# Patient Record
Sex: FEMALE | Race: WHITE | Employment: FULL TIME | URBAN - METROPOLITAN AREA
[De-identification: names, ages, dates, MRNs, and addresses within clinical notes are randomized per-mention and may not be internally consistent; named-entity substitution may affect disease eponyms.]

---

## 2024-06-25 PROBLEM — E66.813 CLASS 3 SEVERE OBESITY DUE TO EXCESS CALORIES WITH BODY MASS INDEX (BMI) OF 45.0 TO 49.9 IN ADULT: Status: ACTIVE | Noted: 2024-06-25

## 2024-06-25 PROCEDURE — 80053 COMPREHEN METABOLIC PANEL: CPT

## 2024-06-25 PROCEDURE — 83690 ASSAY OF LIPASE: CPT

## 2024-06-25 PROCEDURE — 85027 COMPLETE CBC AUTOMATED: CPT

## 2024-07-08 ENCOUNTER — TELEPHONE (OUTPATIENT)
Dept: SURGERY | Facility: CLINIC | Age: 19
End: 2024-07-08
Payer: COMMERCIAL

## 2024-07-08 DIAGNOSIS — R10.84 GENERALIZED ABDOMINAL PAIN: Primary | ICD-10-CM

## 2024-07-08 NOTE — TELEPHONE ENCOUNTER
We have received a denial for the patients upcoming CT abdomen and pelvis with IV contrast. The insurance is requesting that the patient have an ultrasound or x-ray done first. Please advise.

## 2024-07-10 ENCOUNTER — HOSPITAL ENCOUNTER (OUTPATIENT)
Dept: RADIOLOGY | Facility: HOSPITAL | Age: 19
Discharge: HOME | End: 2024-07-10
Payer: COMMERCIAL

## 2024-07-10 ENCOUNTER — APPOINTMENT (OUTPATIENT)
Dept: RADIOLOGY | Facility: HOSPITAL | Age: 19
End: 2024-07-10
Payer: COMMERCIAL

## 2024-07-10 DIAGNOSIS — R10.84 GENERALIZED ABDOMINAL PAIN: ICD-10-CM

## 2024-07-10 PROCEDURE — 76700 US EXAM ABDOM COMPLETE: CPT

## 2024-07-10 PROCEDURE — 76700 US EXAM ABDOM COMPLETE: CPT | Performed by: RADIOLOGY

## 2024-07-15 ENCOUNTER — TELEPHONE (OUTPATIENT)
Dept: SURGERY | Facility: CLINIC | Age: 19
End: 2024-07-15
Payer: COMMERCIAL

## 2024-07-15 DIAGNOSIS — R19.8 ALTERNATING CONSTIPATION AND DIARRHEA: ICD-10-CM

## 2024-07-15 DIAGNOSIS — R10.84 GENERALIZED ABDOMINAL PAIN: Primary | ICD-10-CM

## 2024-07-15 NOTE — TELEPHONE ENCOUNTER
----- Message from Lo Leon sent at 7/15/2024 12:55 PM EDT -----  Please, let the patient know that the ultrasound does not show any abnormality.  Therefore, we will continue with the original plan to schedule her for a CT of the abdomen/pelvis.  Please schedule.

## 2024-07-23 ENCOUNTER — HOSPITAL ENCOUNTER (OUTPATIENT)
Dept: RADIOLOGY | Facility: HOSPITAL | Age: 19
Discharge: HOME | End: 2024-07-23
Payer: COMMERCIAL

## 2024-07-23 ENCOUNTER — APPOINTMENT (OUTPATIENT)
Dept: RADIOLOGY | Facility: HOSPITAL | Age: 19
End: 2024-07-23
Payer: COMMERCIAL

## 2024-07-23 DIAGNOSIS — R10.84 GENERALIZED ABDOMINAL PAIN: ICD-10-CM

## 2024-07-23 DIAGNOSIS — R19.8 ALTERNATING CONSTIPATION AND DIARRHEA: ICD-10-CM

## 2024-07-23 PROCEDURE — 74177 CT ABD & PELVIS W/CONTRAST: CPT

## 2024-07-23 PROCEDURE — 74177 CT ABD & PELVIS W/CONTRAST: CPT | Performed by: RADIOLOGY

## 2024-07-23 PROCEDURE — 2550000001 HC RX 255 CONTRASTS: Performed by: SURGERY

## 2024-07-24 ENCOUNTER — TELEPHONE (OUTPATIENT)
Dept: SURGERY | Facility: CLINIC | Age: 19
End: 2024-07-24
Payer: COMMERCIAL

## 2024-07-24 NOTE — TELEPHONE ENCOUNTER
----- Message from Lo Leon sent at 7/24/2024 11:31 AM EDT -----  Please, let her know that the CT of her abdomen/pelvis does NOT show any abnormalities to explain her pain.  Therefore, recommend that she continues taking the stomach medicine (Protonix) for the 30-day course as prescribed, and follow-up with GI for possible endoscopy evaluation.

## 2024-07-24 NOTE — RESULT ENCOUNTER NOTE
Please, let her know that the CT of her abdomen/pelvis does NOT show any abnormalities to explain her pain.  Therefore, recommend that she continues taking the stomach medicine (Protonix) for the 30-day course as prescribed, and follow-up with GI for possible endoscopy evaluation.

## 2024-09-13 ENCOUNTER — OFFICE VISIT (OUTPATIENT)
Dept: URGENT CARE | Age: 19
End: 2024-09-13
Payer: COMMERCIAL

## 2024-09-13 VITALS
TEMPERATURE: 97.4 F | DIASTOLIC BLOOD PRESSURE: 68 MMHG | OXYGEN SATURATION: 99 % | SYSTOLIC BLOOD PRESSURE: 99 MMHG | HEART RATE: 60 BPM | RESPIRATION RATE: 18 BRPM

## 2024-09-13 DIAGNOSIS — S39.012A LUMBAR STRAIN, INITIAL ENCOUNTER: Primary | ICD-10-CM

## 2024-09-13 RX ORDER — ARIPIPRAZOLE 15 MG/1
7.5 TABLET ORAL DAILY
COMMUNITY
Start: 2024-09-05

## 2024-09-13 RX ORDER — LIDOCAINE 50 MG/G
1 PATCH TOPICAL DAILY
Qty: 10 PATCH | Refills: 0 | Status: SHIPPED | OUTPATIENT
Start: 2024-09-13 | End: 2025-09-13

## 2024-09-13 RX ORDER — SERTRALINE HYDROCHLORIDE 25 MG/1
1 TABLET, FILM COATED ORAL
COMMUNITY
Start: 2024-09-05

## 2024-09-13 RX ORDER — METHYLPREDNISOLONE 4 MG/1
TABLET ORAL
Qty: 21 TABLET | Refills: 0 | Status: SHIPPED | OUTPATIENT
Start: 2024-09-13 | End: 2024-09-20

## 2024-09-13 ASSESSMENT — ENCOUNTER SYMPTOMS
NAUSEA: 0
VOMITING: 0
FEVER: 0
BOWEL INCONTINENCE: 0
DYSURIA: 0
ABDOMINAL PAIN: 0
WEAKNESS: 0
PARESTHESIAS: 0
TINGLING: 0
NUMBNESS: 0
BACK PAIN: 1

## 2024-09-13 ASSESSMENT — PAIN SCALES - GENERAL: PAINLEVEL: 7

## 2024-09-13 NOTE — LETTER
September 13, 2024     Patient: Nikole Almanza   YOB: 2005   Date of Visit: 9/13/2024       To Whom It May Concern:    It is my medical opinion that Nikole Almanza may return to work on 9/14/2024 .    If you have any questions or concerns, please don't hesitate to call.         Sincerely,        Marge Soto PA-C    CC: No Recipients

## 2024-09-13 NOTE — PROGRESS NOTES
"Subjective   Patient ID: Nikole Almanza \"Lizzie" is a 19 y.o. female. They present today with a chief complaint of Back Pain (Low Back Pain/Possibly Strain from lifting).    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 She denies specific injury. She has had similar pain in the past. She states she was seen by her PCP also for same before. Was given referral to a back specialist, though hasn't heard about appointment. She was rX Mobic which isn't helping much. She does not want to do under workman's comp. DENIES: falls, numbness, tingling, weakness, UTI symptoms. NO imaging.       Back Pain  This is a recurrent problem. The current episode started in the past 7 days. The problem is unchanged. The quality of the pain is described as aching. The pain radiates to the right foot. The pain is at a severity of 7/10. The symptoms are aggravated by bending. Pertinent negatives include no abdominal pain, bladder incontinence, bowel incontinence, dysuria, fever, numbness, paresthesias, tingling or weakness. She has tried NSAIDs for the symptoms.       Past Medical History  Allergies as of 09/13/2024    (No Known Allergies)       (Not in a hospital admission)       Past Medical History:   Diagnosis Date    Anxiety     Bipolar 1 disorder (Multi)     Depressed     GERD (gastroesophageal reflux disease)     IBS (irritable bowel syndrome)     OCD (obsessive compulsive disorder)        Past Surgical History:   Procedure Laterality Date    OTHER SURGICAL HISTORY  2007    polp removed    TONSILLECTOMY  06/2019    WISDOM TOOTH EXTRACTION  10/2020        reports that she has never smoked. She has never used smokeless tobacco. She reports current alcohol use. She reports current drug use. Drug: Marijuana.    Review of Systems  Review of Systems   Constitutional:  Negative for fever.   Gastrointestinal:  Negative for abdominal pain, bowel incontinence, " nausea and vomiting.   Genitourinary:  Negative for bladder incontinence and dysuria.   Musculoskeletal:  Positive for back pain.   Neurological:  Negative for tingling, weakness, numbness and paresthesias.                                  Objective    Vitals:    09/13/24 0954   BP: 99/68   Pulse: 60   Resp: 18   Temp: 36.3 °C (97.4 °F)   SpO2: 99%     No LMP recorded.    Physical Exam  Vitals reviewed.   Constitutional:       Appearance: Normal appearance.   HENT:      Head: Normocephalic and atraumatic.   Cardiovascular:      Rate and Rhythm: Normal rate and regular rhythm.      Heart sounds: Normal heart sounds.   Pulmonary:      Effort: Pulmonary effort is normal.      Breath sounds: Normal breath sounds.   Abdominal:      General: Abdomen is flat. Bowel sounds are normal.      Palpations: Abdomen is soft.      Tenderness: There is no abdominal tenderness. There is no right CVA tenderness, left CVA tenderness, guarding or rebound.   Musculoskeletal:      Lumbar back: Spasms and tenderness present. Normal range of motion. Positive right straight leg raise test. Negative left straight leg raise test.      Comments: TTP to bilateral paralumbar musculature, FULL ROM, pain with flexion, extension and right lateral bending. Normal gait.   Neurological:      General: No focal deficit present.      Sensory: Sensation is intact.      Gait: Gait is intact.      Deep Tendon Reflexes: Reflexes are normal and symmetric.      Reflex Scores:       Patellar reflexes are 2+ on the right side and 2+ on the left side.       Achilles reflexes are 2+ on the right side and 2+ on the left side.        Procedures    Point of Care Test & Imaging Results from this visit  No results found for this visit on 09/13/24.   No results found.    Diagnostic study results (if any) were reviewed by Marge Soto PA-C.    Assessment/Plan   Allergies, medications, history, and pertinent labs/EKGs/Imaging reviewed by Marge Soto PA-C.      Medical Decision Making  MDM- History and examination consistent with lumbar strain, no evidence of radiculopathy, acute cord compression, infection, or other vascular process at time. Plan is for supportive measures deferring on emergent imaging as it is not indicated at this time. Rx for lidocaine patches and medrol taper (advised to hold Mobic). Referral to PT. Patient encouraged to return to clinic or present to ED if symptoms change or worsen. Otherwise follow-up with PCP. Patient verbalized understanding and agrees with plan.     Orders and Diagnoses  There are no diagnoses linked to this encounter.    Medical Admin Record      Follow Up Instructions  No follow-ups on file.    Patient disposition: Home    Electronically signed by Marge Soto PA-C  9:58 AM

## 2024-09-16 ENCOUNTER — APPOINTMENT (OUTPATIENT)
Dept: GASTROENTEROLOGY | Facility: CLINIC | Age: 19
End: 2024-09-16
Payer: COMMERCIAL

## 2024-09-26 ENCOUNTER — OFFICE VISIT (OUTPATIENT)
Dept: URGENT CARE | Age: 19
End: 2024-09-26
Payer: COMMERCIAL

## 2024-09-26 ENCOUNTER — HOSPITAL ENCOUNTER (EMERGENCY)
Facility: HOSPITAL | Age: 19
Discharge: HOME | End: 2024-09-26
Attending: EMERGENCY MEDICINE
Payer: COMMERCIAL

## 2024-09-26 ENCOUNTER — DOCUMENTATION (OUTPATIENT)
Dept: URGENT CARE | Age: 19
End: 2024-09-26

## 2024-09-26 ENCOUNTER — APPOINTMENT (OUTPATIENT)
Dept: RADIOLOGY | Facility: HOSPITAL | Age: 19
End: 2024-09-26
Payer: COMMERCIAL

## 2024-09-26 VITALS
OXYGEN SATURATION: 98 % | SYSTOLIC BLOOD PRESSURE: 105 MMHG | TEMPERATURE: 98 F | RESPIRATION RATE: 18 BRPM | DIASTOLIC BLOOD PRESSURE: 61 MMHG | HEART RATE: 73 BPM

## 2024-09-26 VITALS
SYSTOLIC BLOOD PRESSURE: 126 MMHG | BODY MASS INDEX: 40.18 KG/M2 | HEIGHT: 66 IN | HEART RATE: 80 BPM | OXYGEN SATURATION: 99 % | DIASTOLIC BLOOD PRESSURE: 83 MMHG | WEIGHT: 250 LBS | RESPIRATION RATE: 18 BRPM | TEMPERATURE: 97.3 F

## 2024-09-26 DIAGNOSIS — R11.0 NAUSEA: ICD-10-CM

## 2024-09-26 DIAGNOSIS — K52.9 COLITIS: Primary | ICD-10-CM

## 2024-09-26 DIAGNOSIS — R10.31 RIGHT LOWER QUADRANT PAIN: Primary | ICD-10-CM

## 2024-09-26 LAB
ANION GAP SERPL CALC-SCNC: 11 MMOL/L (ref 10–20)
BASOPHILS # BLD AUTO: 0.04 X10*3/UL (ref 0–0.1)
BASOPHILS NFR BLD AUTO: 0.4 %
BUN SERPL-MCNC: 7 MG/DL (ref 6–23)
CALCIUM SERPL-MCNC: 8.9 MG/DL (ref 8.6–10.3)
CHLORIDE SERPL-SCNC: 105 MMOL/L (ref 98–107)
CO2 SERPL-SCNC: 25 MMOL/L (ref 21–32)
CREAT SERPL-MCNC: 0.81 MG/DL (ref 0.5–1.05)
EGFRCR SERPLBLD CKD-EPI 2021: >90 ML/MIN/1.73M*2
EOSINOPHIL # BLD AUTO: 0.11 X10*3/UL (ref 0–0.7)
EOSINOPHIL NFR BLD AUTO: 1 %
ERYTHROCYTE [DISTWIDTH] IN BLOOD BY AUTOMATED COUNT: 14.1 % (ref 11.5–14.5)
GLUCOSE SERPL-MCNC: 107 MG/DL (ref 74–99)
HCT VFR BLD AUTO: 44.1 % (ref 36–46)
HGB BLD-MCNC: 14.1 G/DL (ref 12–16)
IMM GRANULOCYTES # BLD AUTO: 0.04 X10*3/UL (ref 0–0.7)
IMM GRANULOCYTES NFR BLD AUTO: 0.4 % (ref 0–0.9)
LACTATE SERPL-SCNC: 1.3 MMOL/L (ref 0.4–2)
LYMPHOCYTES # BLD AUTO: 2.12 X10*3/UL (ref 1.2–4.8)
LYMPHOCYTES NFR BLD AUTO: 18.8 %
MCH RBC QN AUTO: 28.7 PG (ref 26–34)
MCHC RBC AUTO-ENTMCNC: 32 G/DL (ref 32–36)
MCV RBC AUTO: 90 FL (ref 80–100)
MONOCYTES # BLD AUTO: 0.51 X10*3/UL (ref 0.1–1)
MONOCYTES NFR BLD AUTO: 4.5 %
NEUTROPHILS # BLD AUTO: 8.43 X10*3/UL (ref 1.2–7.7)
NEUTROPHILS NFR BLD AUTO: 74.9 %
NRBC BLD-RTO: 0 /100 WBCS (ref 0–0)
PLATELET # BLD AUTO: 315 X10*3/UL (ref 150–450)
POC APPEARANCE, URINE: CLEAR
POC BILIRUBIN, URINE: NEGATIVE
POC BLOOD, URINE: NEGATIVE
POC COLOR, URINE: ABNORMAL
POC GLUCOSE, URINE: NEGATIVE MG/DL
POC KETONES, URINE: NEGATIVE MG/DL
POC LEUKOCYTES, URINE: NEGATIVE
POC NITRITE,URINE: NEGATIVE
POC PH, URINE: 6 PH
POC PROTEIN, URINE: NEGATIVE MG/DL
POC RAPID INFLUENZA A: NEGATIVE
POC RAPID INFLUENZA B: NEGATIVE
POC SARS-COV-2 AG BINAX: NORMAL
POC SPECIFIC GRAVITY, URINE: 1.01
POC UROBILINOGEN, URINE: 2 EU/DL
POTASSIUM SERPL-SCNC: 4.1 MMOL/L (ref 3.5–5.3)
PREGNANCY TEST URINE, POC: NEGATIVE
RBC # BLD AUTO: 4.92 X10*6/UL (ref 4–5.2)
SODIUM SERPL-SCNC: 137 MMOL/L (ref 136–145)
WBC # BLD AUTO: 11.3 X10*3/UL (ref 4.4–11.3)

## 2024-09-26 PROCEDURE — 96375 TX/PRO/DX INJ NEW DRUG ADDON: CPT

## 2024-09-26 PROCEDURE — 96361 HYDRATE IV INFUSION ADD-ON: CPT

## 2024-09-26 PROCEDURE — 2500000004 HC RX 250 GENERAL PHARMACY W/ HCPCS (ALT 636 FOR OP/ED): Performed by: NURSE PRACTITIONER

## 2024-09-26 PROCEDURE — 74177 CT ABD & PELVIS W/CONTRAST: CPT

## 2024-09-26 PROCEDURE — 82374 ASSAY BLOOD CARBON DIOXIDE: CPT | Performed by: NURSE PRACTITIONER

## 2024-09-26 PROCEDURE — 96374 THER/PROPH/DIAG INJ IV PUSH: CPT

## 2024-09-26 PROCEDURE — 2550000001 HC RX 255 CONTRASTS: Performed by: NURSE PRACTITIONER

## 2024-09-26 PROCEDURE — 85025 COMPLETE CBC W/AUTO DIFF WBC: CPT | Performed by: NURSE PRACTITIONER

## 2024-09-26 PROCEDURE — 83605 ASSAY OF LACTIC ACID: CPT | Performed by: NURSE PRACTITIONER

## 2024-09-26 PROCEDURE — 99284 EMERGENCY DEPT VISIT MOD MDM: CPT

## 2024-09-26 PROCEDURE — 74177 CT ABD & PELVIS W/CONTRAST: CPT | Performed by: RADIOLOGY

## 2024-09-26 PROCEDURE — 36415 COLL VENOUS BLD VENIPUNCTURE: CPT | Performed by: NURSE PRACTITIONER

## 2024-09-26 RX ORDER — KETOROLAC TROMETHAMINE 30 MG/ML
30 INJECTION, SOLUTION INTRAMUSCULAR; INTRAVENOUS ONCE
Status: COMPLETED | OUTPATIENT
Start: 2024-09-26 | End: 2024-09-26

## 2024-09-26 RX ORDER — ONDANSETRON HYDROCHLORIDE 2 MG/ML
4 INJECTION, SOLUTION INTRAVENOUS ONCE
Status: COMPLETED | OUTPATIENT
Start: 2024-09-26 | End: 2024-09-26

## 2024-09-26 RX ADMIN — KETOROLAC TROMETHAMINE 30 MG: 30 INJECTION, SOLUTION INTRAMUSCULAR at 14:13

## 2024-09-26 RX ADMIN — SODIUM CHLORIDE 1000 ML: 9 INJECTION, SOLUTION INTRAVENOUS at 14:19

## 2024-09-26 RX ADMIN — ONDANSETRON 4 MG: 2 INJECTION INTRAMUSCULAR; INTRAVENOUS at 14:13

## 2024-09-26 RX ADMIN — IOHEXOL 100 ML: 350 INJECTION, SOLUTION INTRAVENOUS at 17:20

## 2024-09-26 ASSESSMENT — PAIN DESCRIPTION - ORIENTATION: ORIENTATION: RIGHT;LOWER

## 2024-09-26 ASSESSMENT — ENCOUNTER SYMPTOMS
DIARRHEA: 1
NEUROLOGICAL NEGATIVE: 1
MUSCULOSKELETAL NEGATIVE: 1
VOMITING: 1
PSYCHIATRIC NEGATIVE: 1
COUGH: 1

## 2024-09-26 ASSESSMENT — PAIN SCALES - GENERAL
PAINLEVEL_OUTOF10: 6
PAINLEVEL_OUTOF10: 7
PAINLEVEL_OUTOF10: 7

## 2024-09-26 ASSESSMENT — PAIN DESCRIPTION - PAIN TYPE: TYPE: ACUTE PAIN

## 2024-09-26 ASSESSMENT — COLUMBIA-SUICIDE SEVERITY RATING SCALE - C-SSRS
2. HAVE YOU ACTUALLY HAD ANY THOUGHTS OF KILLING YOURSELF?: NO
1. IN THE PAST MONTH, HAVE YOU WISHED YOU WERE DEAD OR WISHED YOU COULD GO TO SLEEP AND NOT WAKE UP?: NO
6. HAVE YOU EVER DONE ANYTHING, STARTED TO DO ANYTHING, OR PREPARED TO DO ANYTHING TO END YOUR LIFE?: NO

## 2024-09-26 ASSESSMENT — PAIN DESCRIPTION - LOCATION: LOCATION: ABDOMEN

## 2024-09-26 ASSESSMENT — PAIN - FUNCTIONAL ASSESSMENT: PAIN_FUNCTIONAL_ASSESSMENT: 0-10

## 2024-09-26 NOTE — Clinical Note
Nikole Almanza was seen and treated in our emergency department on 9/26/2024.  She may return to work on 09/27/2024.       If you have any questions or concerns, please don't hesitate to call.      Jeanette Bennett MD

## 2024-09-26 NOTE — ED PROVIDER NOTES
Chief Complaint   Patient presents with    Abdominal Pain     LRQ abd pain began last Friday NVD and febrile at home        HPI       19 year old female presents to the Emergency Department today complaining of a 3 day history of RLQ pain that she describes as sharp in nature, constant, non-radiating, and varies in intensity. Reports to having nausea with multiple episodes of vomiting and the inability to keep by mouth fluids down associated with the above. Developed a fever yesterday. Denies any associated fever, chills, headache, neck pain, chest pain, shortness of breath, constipation, hematemesis, hematochezia, melena, or urinary symptoms.       History provided by:  Patient             Patient History   Past Medical History:   Diagnosis Date    Anxiety     Bipolar 1 disorder (Multi)     Depressed     GERD (gastroesophageal reflux disease)     IBS (irritable bowel syndrome)     OCD (obsessive compulsive disorder)      Past Surgical History:   Procedure Laterality Date    OTHER SURGICAL HISTORY  2007    polp removed    TONSILLECTOMY  06/2019    WISDOM TOOTH EXTRACTION  10/2020     Family History   Problem Relation Name Age of Onset    Hypertension Mother      Hypertension Father      Diabetes Father      Heart failure Father      Hypertension Maternal Grandfather      Stomach cancer Maternal Grandfather      Hypertension Paternal Grandmother      Diabetes Paternal Grandmother      Heart failure Paternal Grandmother      COPD Paternal Grandmother      Ovarian cancer Paternal Grandmother      Hypertension Paternal Grandfather      Diabetes Paternal Grandfather       Social History     Tobacco Use    Smoking status: Never    Smokeless tobacco: Never   Vaping Use    Vaping status: Some Days   Substance Use Topics    Alcohol use: Yes     Comment: occassional    Drug use: Yes     Types: Marijuana     Comment: very occassional           Physical Exam  Constitutional:       Appearance: Normal appearance.   HENT:       Head: Normocephalic.      Right Ear: Tympanic membrane, ear canal and external ear normal.      Left Ear: Tympanic membrane, ear canal and external ear normal.      Nose: Nose normal.      Mouth/Throat:      Mouth: Mucous membranes are moist.      Pharynx: Oropharynx is clear. No oropharyngeal exudate or posterior oropharyngeal erythema.   Eyes:      Conjunctiva/sclera: Conjunctivae normal.      Pupils: Pupils are equal, round, and reactive to light.   Cardiovascular:      Rate and Rhythm: Normal rate and regular rhythm.      Pulses:           Radial pulses are 3+ on the right side and 3+ on the left side.        Dorsalis pedis pulses are 3+ on the right side and 3+ on the left side.      Heart sounds: Normal heart sounds. No murmur heard.     No friction rub. No gallop.   Pulmonary:      Effort: Pulmonary effort is normal. No respiratory distress.      Breath sounds: Normal breath sounds. No wheezing, rhonchi or rales.   Abdominal:      General: Abdomen is flat. Bowel sounds are normal.      Palpations: Abdomen is soft.      Tenderness: There is no abdominal tenderness. There is no right CVA tenderness, left CVA tenderness, guarding or rebound. Negative signs include Torrez's sign and McBurney's sign.   Musculoskeletal:         General: No swelling or deformity.      Cervical back: Full passive range of motion without pain.      Right lower leg: No edema.      Left lower leg: No edema.   Lymphadenopathy:      Cervical: No cervical adenopathy.   Skin:     Capillary Refill: Capillary refill takes less than 2 seconds.      Coloration: Skin is not jaundiced.      Findings: No rash.   Neurological:      General: No focal deficit present.      Mental Status: She is alert and oriented to person, place, and time. Mental status is at baseline.      Gait: Gait is intact.   Psychiatric:         Mood and Affect: Mood normal.         Behavior: Behavior is cooperative.         Labs Reviewed - No data to display    No orders to  display            ED Course & MDM   Diagnoses as of 09/26/24 1855   Colitis           Medical Decision Making  Patient was seen and evaluated by myself in triage per [facility] protocol. History and physical exam was obtained and orders were placed based on such.      Sterling Restrepo MSN CNP      Took over care of this patient when she was brought back at 1855.    Patient presents to the emergency department secondary to abdominal pain and diarrhea.  Patient has had fever at home.  Last fever was yesterday.    Physical exam on the patient currently.  Patient has some mild right sided tenderness to palpation.  No rebound or guarding.  Bowel sounds are present.  Heart rate is regular.  Lungs clear to auscultation.  Extremities are nontender.    Laboratory workup shows a glucose of 107.  Lactate is normal.  White counts within normal limits.  CT scan shows evidence of possible colitis.  I went and discussed symptoms with the patient.  She states that last time she had a fever was yesterday and that she actually has a longstanding history of diarrhea.  At this time I would not treat the patient with antibiotics with negative lab work.  This likely is a self-limited illness.  Patient was given referral to GI as an outpatient for possible scope.  She should return for new or worsening symptoms.      I saw the patient in conjunction with the nurse practitioner.  I reviewed his history of present illness and physical exam.  I agree with his documentation.           Your medication list        ASK your doctor about these medications        Instructions Last Dose Given Next Dose Due   ARIPiprazole 15 mg tablet  Commonly known as: Tanvi           Junel FE 1.5/30 (28) 1.5 mg-30 mcg (21)/75 mg (7) tablet  Generic drug: norethindrone-e.estradioL-iron           lidocaine 5 % patch  Commonly known as: Lidoderm      Place 1 patch over 12 hours on the skin once daily. Apply to painful area 12 hours per day, remove for 12  hours.       methylPREDNISolone 4 mg tablets  Commonly known as: Medrol Dospak  Ask about: Should I take this medication?      Take as directed on package.       pantoprazole 40 mg EC tablet  Commonly known as: ProtoNix      Take 1 tablet (40 mg) by mouth once daily in the morning. Take before meals. Do not crush, chew, or split.       sertraline 25 mg tablet  Commonly known as: Zoloft                      Procedure  Procedures     Jeanette Bennett MD  09/26/24 9228

## 2024-09-27 NOTE — PROGRESS NOTES
"Subjective   Patient ID: Nikole Almanza \"Lizzie" is a 19 y.o. female. They present today with a chief complaint of Vomiting, Diarrhea, Nausea, URI, Cough, and Nasal Congestion.    History of Present Illness  18 yo female presents with c/o nausea, diarrhea, abdominal pain, chills x 2-3 days.  Food makes it worse, nothing makes it better      Vomiting  Associated symptoms: cough, diarrhea and URI    Diarrhea  Associated symptoms: URI and vomiting    URI  Presenting symptoms: cough    Cough        Past Medical History  Allergies as of 09/26/2024    (No Known Allergies)       (Not in a hospital admission)       Past Medical History:   Diagnosis Date    Anxiety     Bipolar 1 disorder (Multi)     Depressed     GERD (gastroesophageal reflux disease)     IBS (irritable bowel syndrome)     OCD (obsessive compulsive disorder)        Past Surgical History:   Procedure Laterality Date    OTHER SURGICAL HISTORY  2007    polp removed    TONSILLECTOMY  06/2019    WISDOM TOOTH EXTRACTION  10/2020        reports that she has never smoked. She has never used smokeless tobacco. She reports current alcohol use. She reports current drug use. Drug: Marijuana.    Review of Systems  Review of Systems   Respiratory:  Positive for cough.    Gastrointestinal:  Positive for diarrhea and vomiting.   Genitourinary: Negative.    Musculoskeletal: Negative.    Neurological: Negative.    Psychiatric/Behavioral: Negative.                                    Objective    Vitals:    09/26/24 1042   BP: 105/61   Pulse: 73   Resp: 18   Temp: 36.7 °C (98 °F)   SpO2: 98%     Patient's last menstrual period was 09/16/2024.    Physical Exam  Constitutional:       Appearance: She is ill-appearing.   Cardiovascular:      Rate and Rhythm: Normal rate and regular rhythm.   Pulmonary:      Effort: Pulmonary effort is normal.      Breath sounds: Normal breath sounds.   Abdominal:      General: There is no distension.      Palpations: Abdomen is soft. There is " no mass.      Tenderness: There is abdominal tenderness (Tender RLQ, midepigastric). There is left CVA tenderness. There is no right CVA tenderness, guarding or rebound.      Hernia: No hernia is present.      Comments: Hypoactive bowel sounds   Musculoskeletal:         General: Normal range of motion.   Skin:     General: Skin is warm.      Capillary Refill: Capillary refill takes less than 2 seconds.   Neurological:      General: No focal deficit present.      Mental Status: She is alert and oriented to person, place, and time.   Psychiatric:         Mood and Affect: Mood normal.         Behavior: Behavior normal.         Thought Content: Thought content normal.         Judgment: Judgment normal.         Procedures    Point of Care Test & Imaging Results from this visit  Results for orders placed or performed in visit on 09/26/24   POCT Covid-19 Rapid Antigen   Result Value Ref Range    POC ANGELIQUE-COV-2 AG  Presumptive negative test for SARS-CoV-2 (no antigen detected)     Presumptive negative test for SARS-CoV-2 (no antigen detected)   POCT Influenza A/B manually resulted   Result Value Ref Range    POC Rapid Influenza A Negative Negative    POC Rapid Influenza B Negative Negative   POCT UA Automated manually resulted   Result Value Ref Range    POC Color, Urine Light-Yellow Straw, Yellow, Light-Yellow    POC Appearance, Urine Clear Clear    POC Glucose, Urine NEGATIVE NEGATIVE mg/dl    POC Bilirubin, Urine NEGATIVE NEGATIVE    POC Ketones, Urine NEGATIVE NEGATIVE mg/dl    POC Specific Gravity, Urine 1.015 1.005 - 1.035    POC Blood, Urine NEGATIVE NEGATIVE    POC PH, Urine 6.0 No Reference Range Established PH    POC Protein, Urine NEGATIVE NEGATIVE, 30 (1+) mg/dl    POC Urobilinogen, Urine 2.0 (A) 0.2, 1.0 EU/DL    Poc Nitrite, Urine NEGATIVE NEGATIVE    POC Leukocytes, Urine NEGATIVE NEGATIVE   POCT pregnancy, urine manually resulted   Result Value Ref Range    Preg Test, Ur Negative Negative      CT abdomen  pelvis w IV contrast    Result Date: 9/26/2024  Interpreted By:  Mae Medrano, STUDY: CT ABDOMEN PELVIS W IV CONTRAST;  9/26/2024 5:28 pm   INDICATION: 18 y/o   F with  Signs/Symptoms:RLQ pain.   LIMITATIONS: None.   ACCESSION NUMBER(S): [id number]   ORDERING CLINICIAN: IVAN KU   TECHNIQUE: After the administration of IV iodonated contrast, spiral axial images were obtained from the xiphoid down through the symphysis pubis. Sagittal and coronal reconstruction images were generated. 100 mL of Omnipaque 350.   COMPARISON: 07/23/2024   FINDINGS: Lower Chest: Clear.   Liver: The liver is unremarkable without focal lesion.   Gallbladder and Biliary: Unremarkable.   Pancreas: No abnormality identified in the pancreas.   Spleen: No abnormality identified in the spleen.   Adrenals: No abnormality identified in either adrenal gland.   Urinary: No parenchymal abnormality identified in either kidney. No hydronephrosis.   Gastrointestinal/Peritoneum: No small or large bowel obstruction in the visualized abdomen. In the abdomen, there is no extraluminal air. No significant free fluid. No evidence of acute appendicitis. Mild bowel wall thickening involving the cecum, ascending colon, transverse colon, and descending colon suggestive of colitis in the appropriate clinical setting.   Vascular: Abdominal aorta is normal in caliber.   Lymphatics: No enlarged lymph nodes by size criteria.   MSK/Body Wall: No aggressive bony lesion identified.       Mild bowel wall thickening extending from the cecum to the descending colon suggestive of colitis in the appropriate clinical setting.   Signed by: Mae Medrano 9/26/2024 5:56 PM Dictation workstation:   [id number]     Diagnostic study results (if any) were reviewed by [facility].    Assessment/Plan   Allergies, medications, history, and pertinent labs/EKGs/Imaging reviewed by Gladys Blandon, APRN-CNP.     Medical Decision Making  Patient with signs and  symptoms consistent with abdominal pain  that cannot be properly assessed in office and is stable. Patient referred to ED for further  evaluation and testing.   Dr Waldrop notified  Orders and Diagnoses  Diagnoses and all orders for this visit:  Right lower quadrant pain  Nausea  -     POCT Covid-19 Rapid Antigen  -     POCT Influenza A/B manually resulted  -     POCT UA Automated manually resulted  -     POCT pregnancy, urine manually resulted      Medical Admin Record      Patient disposition: ED    Electronically signed by [facility]  11:22 PM

## 2024-09-28 NOTE — PROGRESS NOTES
"Subjective   Patient ID: Nikole Almanza \"Lizzie" is a 19 y.o. female. They present today with a chief complaint of No chief complaint on file..    History of Present Illness  HPI    Past Medical History  Allergies as of 09/26/2024    (No Known Allergies)       (Not in a hospital admission)       Past Medical History:   Diagnosis Date    Anxiety     Bipolar 1 disorder (Multi)     Depressed     GERD (gastroesophageal reflux disease)     IBS (irritable bowel syndrome)     OCD (obsessive compulsive disorder)        Past Surgical History:   Procedure Laterality Date    OTHER SURGICAL HISTORY  2007    polp removed    TONSILLECTOMY  06/2019    WISDOM TOOTH EXTRACTION  10/2020        reports that she has never smoked. She has never used smokeless tobacco. She reports current alcohol use. She reports current drug use. Drug: Marijuana.    Review of Systems  Review of Systems                               Objective    There were no vitals filed for this visit.  Patient's last menstrual period was 09/16/2024.    Physical Exam    Procedures    Point of Care Test & Imaging Results from this visit  No results found for this visit on 09/26/24.   CT abdomen pelvis w IV contrast    Result Date: 9/26/2024  Interpreted By:  Mae Medrano, STUDY: CT ABDOMEN PELVIS W IV CONTRAST;  9/26/2024 5:28 pm   INDICATION: 18 y/o   F with  Signs/Symptoms:RLQ pain.   LIMITATIONS: None.   ACCESSION NUMBER(S): [id number]   ORDERING CLINICIAN: IVAN KU   TECHNIQUE: After the administration of IV iodonated contrast, spiral axial images were obtained from the xiphoid down through the symphysis pubis. Sagittal and coronal reconstruction images were generated. 100 mL of Omnipaque 350.   COMPARISON: 07/23/2024   FINDINGS: Lower Chest: Clear.   Liver: The liver is unremarkable without focal lesion.   Gallbladder and Biliary: Unremarkable.   Pancreas: No abnormality identified in the pancreas.   Spleen: No abnormality identified in the " spleen.   Adrenals: No abnormality identified in either adrenal gland.   Urinary: No parenchymal abnormality identified in either kidney. No hydronephrosis.   Gastrointestinal/Peritoneum: No small or large bowel obstruction in the visualized abdomen. In the abdomen, there is no extraluminal air. No significant free fluid. No evidence of acute appendicitis. Mild bowel wall thickening involving the cecum, ascending colon, transverse colon, and descending colon suggestive of colitis in the appropriate clinical setting.   Vascular: Abdominal aorta is normal in caliber.   Lymphatics: No enlarged lymph nodes by size criteria.   MSK/Body Wall: No aggressive bony lesion identified.       Mild bowel wall thickening extending from the cecum to the descending colon suggestive of colitis in the appropriate clinical setting.   Signed by: Mae Medrano 9/26/2024 5:56 PM Dictation workstation:   [id number]     Diagnostic study results (if any) were reviewed by LUIZ Fournier.    Assessment/Plan   Allergies, medications, history, and pertinent labs/EKGs/Imaging reviewed by LUIZ Fournier.     Medical Decision Making      Orders and Diagnoses  There are no diagnoses linked to this encounter.    Medical Admin Record      Patient disposition: ED    Electronically signed by LUIZ Fournier  11:50 AM

## 2024-10-07 ENCOUNTER — EVALUATION (OUTPATIENT)
Dept: PHYSICAL THERAPY | Facility: CLINIC | Age: 19
End: 2024-10-07
Payer: COMMERCIAL

## 2024-10-07 DIAGNOSIS — S39.012A LUMBAR STRAIN, INITIAL ENCOUNTER: ICD-10-CM

## 2024-10-07 PROCEDURE — 97140 MANUAL THERAPY 1/> REGIONS: CPT | Mod: GP

## 2024-10-07 PROCEDURE — 97161 PT EVAL LOW COMPLEX 20 MIN: CPT | Mod: GP

## 2024-10-07 PROCEDURE — 97110 THERAPEUTIC EXERCISES: CPT | Mod: GP

## 2024-10-07 ASSESSMENT — COLUMBIA-SUICIDE SEVERITY RATING SCALE - C-SSRS
1. IN THE PAST MONTH, HAVE YOU WISHED YOU WERE DEAD OR WISHED YOU COULD GO TO SLEEP AND NOT WAKE UP?: NO
6. HAVE YOU EVER DONE ANYTHING, STARTED TO DO ANYTHING, OR PREPARED TO DO ANYTHING TO END YOUR LIFE?: NO
2. HAVE YOU ACTUALLY HAD ANY THOUGHTS OF KILLING YOURSELF?: NO

## 2024-10-07 ASSESSMENT — ENCOUNTER SYMPTOMS
DEPRESSION: 0
OCCASIONAL FEELINGS OF UNSTEADINESS: 1
LOSS OF SENSATION IN FEET: 1

## 2024-10-07 ASSESSMENT — PATIENT HEALTH QUESTIONNAIRE - PHQ9
1. LITTLE INTEREST OR PLEASURE IN DOING THINGS: NOT AT ALL
2. FEELING DOWN, DEPRESSED OR HOPELESS: NOT AT ALL
SUM OF ALL RESPONSES TO PHQ9 QUESTIONS 1 AND 2: 0

## 2024-10-07 NOTE — PROGRESS NOTES
"        Physical Therapy Evaluation and Treatment      Patient Name: Nikole Almanza \"Suki\"  MRN: [id number]  Today's Date: 10/7/2024     Time Calculation  Start Time: 1445  Stop Time: 1530  Time Calculation (min): 45 min  PT Therapeutic Procedures Time Entry  Manual Therapy Time Entry: 22  Therapeutic Exercise Time Entry: 8    Visit:1/MN  Referred by: Marge Soto PA-C       Insurance: [facility]   Certification Dates:  10/7/2024 - 12/31/2024  Current Problem:   Problem List Items Addressed This Visit    None  Visit Diagnoses         Codes    Lumbar strain, initial encounter     S39.012A          1. Lumbar strain, initial encounter  Referral to Physical Therapy                 Subjective:  Pt stated she went to PT for the same back pain issue in high school. She played softball and participated in marching band. Stated she was lifting heavy loads at work and had an onset of pain when she was leaving. Her pain was severe and it was difficult to sit in the car. There are times when her low back pain will keep her up at night. She is unable to sleep on her back due to pain.    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  The quality of the pain is described as aching. The pain radiates to the right foot. The pain is at a severity of 7/10. The symptoms are aggravated by bending     Social Factors:  1-2 personal factors &/or " comorbidities (depression, migraines)     Precautions: as tolerated        Objective   ROM   Hip Strength: MMT Left Right   Flexion 5/5 5/5   Abduction 5/5 5/5   Quadriceps 5/5 5/5   Hamstrings 5/5 5/5   *denotes pain with motion or muscle testing      Lumbar AROM: (% movement)   Flexion 1-2 inches from floor *   Extension WNL    Right Sidebend 30*   Left Sidebend 30*   Right Rotation 40   Left Rotation 40   Flexibility:      Hamstrings 90 B   Hip Flexion Rt 100, Lt 103     Palpation: + tenderness Rt low back, L2 - L5, PSIS  Special Testing:   SLR: Rt +, Lt -         Slump: Rt +, Lt -                               Wt Shift Rt  - , Lt -    DTR's:  Patellar (L3/L4): 2+ Calcaneal (S1/S2): 2+    Gait: amb w/o AD    Outcome Measures: DORIS= 20%    Treatments:    Access Code: [email or web address]  URL: [email or web address]  Date: 10/07/2024  Prepared by: Yulia Hopper    Exercises  - Supine Posterior Pelvic Tilt  - 1 x daily - 7 x weekly - 1-2 sets - 10 reps - 5 hold  - Supine Lower Trunk Rotation  - 1 x daily - 7 x weekly - 1-2 sets - 10 reps - 5 hold  - Supine Bridge  - 1 x daily - 7 x weekly - 1-2 sets - 10 reps - 5 hold  - Prone Press Up  - 1 x daily - 7 x weekly - 1-2 sets - 10 reps - 5 hold    Manual  STM through Rt low back L2 - L5, pt prone, Rt long axis distraction     Assessment:     Pt is a 18 yo female who presented to the clinic today  for evaluation of their lumbar strain.  PMH: Bipolar, depression, anxiety, migraines.  Examination reveals the following deficits: increase pain at end range of motion through lumbar segments and decrease functional mobility to perform heavy lifts at work. These deficits lead to difficulties with ADLs as well as recreational activity.  Skilled PT will address these deficits to help reduce pain for return to PLOF.  She will benefit from skilled PT to address the above mentioned impairments.  Low complexity due to patient's clinical presentation being stable and  uncomplicated by any significant comorbidities that may affect rehab tolerance and progression.     Clinical presentation:  Stable and/or uncomplicated characteristics,     Problem List:  increase pain at end range of motion through lumbar segments and decrease functional mobility to perform heavy lifts at work.    Plan: Continue per POC & pt tolerance.  Frequency/duration: 1-2x/wk for 8 wks      Planned Interventions:  MT, therapeutic exercise, neuro re-ed, modalities prn  Patient/caregiver agrees with POC:  yes    Rehab potential: good  Plan of care agreement: Patient understands & agrees with goals & plan documented    EDUCATION: HEP, POC, activity modifications/progression, pain management/modalities, and injury pathology     TODAY'S TREATMENT  - Evaluation of the low back completed.  Pt was educated on their dx and the pertinent anatomy.    - HEP as seen above:    Goals:   Active       PT Problem       PT Goal 1       Start:  10/07/24    Expected End:  12/31/24       Pt independent with HEP and consistent for optimal recovery and self-management after DC from PT.           PT Goal 2       Start:  10/07/24    Expected End:  12/31/24       Pt will decrease DORIS score to < or = 8% to demonstrate functional improvement.           PT Goal 3       Start:  10/07/24    Expected End:  12/31/24       Pt will demonstrate proper biomechanics for lifting 75% of the time to tolerate lifting boxes at work.         PT Goal 4       Start:  10/07/24    Expected End:  12/31/24       Pt will decrease pain at worst to 4/10 to tolerate positional loads at end range and complete work with minimal pain.

## 2024-10-17 ENCOUNTER — CONSULT (OUTPATIENT)
Facility: HOSPITAL | Age: 19
End: 2024-10-17
Payer: COMMERCIAL

## 2024-10-17 VITALS — SYSTOLIC BLOOD PRESSURE: 140 MMHG | DIASTOLIC BLOOD PRESSURE: 82 MMHG | HEART RATE: 88 BPM | RESPIRATION RATE: 20 BRPM

## 2024-10-17 DIAGNOSIS — M54.41 MIDLINE LOW BACK PAIN WITH RIGHT-SIDED SCIATICA, UNSPECIFIED CHRONICITY: ICD-10-CM

## 2024-10-17 DIAGNOSIS — M54.16 RIGHT LUMBAR RADICULOPATHY: Primary | ICD-10-CM

## 2024-10-17 PROCEDURE — 99214 OFFICE O/P EST MOD 30 MIN: CPT | Performed by: PHYSICAL MEDICINE & REHABILITATION

## 2024-10-17 PROCEDURE — 99204 OFFICE O/P NEW MOD 45 MIN: CPT | Performed by: PHYSICAL MEDICINE & REHABILITATION

## 2024-10-17 RX ORDER — GABAPENTIN 300 MG/1
300 CAPSULE ORAL 2 TIMES DAILY
Qty: 60 CAPSULE | Refills: 3 | Status: SHIPPED | OUTPATIENT
Start: 2024-10-17 | End: 2024-11-16

## 2024-10-17 RX ORDER — OMEPRAZOLE 20 MG/1
20 CAPSULE, DELAYED RELEASE ORAL
COMMUNITY
Start: 2024-10-07

## 2024-10-17 ASSESSMENT — PAIN SCALES - GENERAL: PAINLEVEL_OUTOF10: 4

## 2024-10-17 NOTE — PROGRESS NOTES
Physical Medicine and Rehabilitation MSK Consult  10/17/24       Chief Complaint:  Low back pain    Referred by pcp     HPI:  Nikole Almanza is a  19 y.o. F ho presents to the clinic today  for evaluation of low back pain.  Onset: pain started in 2020 on and off, better therapy  Started flair ing up 6 weeks ago at work, not sure if she did anything  Location: R lower back  Radiation: buttock., back of thigh and calf into ankle, no issues w the ankle  Quality: sharp,tingling and numb  Sometimes weakness, few times gave out  Duration: comes and goes daily  Aggravating factors:  sitting, coughing  Alleviating factors: changing positions  Severity: 6  Numbness/Tingling: Yes -   Bowel or bladder incontinence: No  Pt's current medication regimen includes: mobic w mild relief  Lidocaine patches wo relief     Treatment to date:  Physical therapy: Yes - [id number] w some relief  And now she has PT again  Medications taken to date for this complaint include the following:   As above  Prior injections: Yes -         Imaging  None of spine     Past Medical History:   Diagnosis Date    Anxiety     Bipolar 1 disorder (Multi)     Depressed     GERD (gastroesophageal reflux disease)     IBS (irritable bowel syndrome)     OCD (obsessive compulsive disorder)         Past Surgical History:   Procedure Laterality Date    OTHER SURGICAL HISTORY  2007    polp removed    TONSILLECTOMY  06/2019    WISDOM TOOTH EXTRACTION  10/2020        Patient Active Problem List    Diagnosis Date Noted    Alternating constipation and diarrhea 06/25/2024    Right upper quadrant abdominal pain 06/25/2024    Generalized abdominal pain 06/25/2024    Class 3 severe obesity due to excess calories with body mass index (BMI) of 45.0 to 49.9 in adult 06/25/2024        Family History   Problem Relation Name Age of Onset    Hypertension Mother      Hypertension Father      Diabetes Father      Heart failure Father      Hypertension Maternal Grandfather       Stomach cancer Maternal Grandfather      Hypertension Paternal Grandmother      Diabetes Paternal Grandmother      Heart failure Paternal Grandmother      COPD Paternal Grandmother      Ovarian cancer Paternal Grandmother      Hypertension Paternal Grandfather      Diabetes Paternal Grandfather          Current Outpatient Medications   Medication Sig Dispense Refill    ARIPiprazole (Abilify) 15 mg tablet Take 0.5 tablets (7.5 mg) by mouth once daily.      Junel FE 1.5/30, 28, 1.5 mg-30 mcg (21)/75 mg (7) tablet       omeprazole (PriLOSEC) 20 mg DR capsule Take 1 capsule (20 mg) by mouth once daily in the morning. Take before meals.      sertraline (Zoloft) 25 mg tablet Take 1 tablet (25 mg) by mouth early in the morning..      lidocaine (Lidoderm) 5 % patch Place 1 patch over 12 hours on the skin once daily. Apply to painful area 12 hours per day, remove for 12 hours. (Patient not taking: Reported on 10/17/2024) 10 patch 0    pantoprazole (ProtoNix) 40 mg EC tablet Take 1 tablet (40 mg) by mouth once daily in the morning. Take before meals. Do not crush, chew, or split. 30 tablet 0     No current facility-administered medications for this visit.        No Known Allergies     Social History     Socioeconomic History    Marital status: Significant Other   Tobacco Use    Smoking status: Every Day     Types: Cigarettes    Smokeless tobacco: Never    Tobacco comments:     vape   Vaping Use    Vaping status: Some Days   Substance and Sexual Activity    Alcohol use: Yes     Alcohol/week: 1.0 standard drink of alcohol     Types: 1 Cans of beer per week     Comment: occassional    Drug use: Yes     Types: Marijuana     Comment: very occassional   Lives w bf  Works in [employer], ,  Smokes vape in a week  1 drink/week  Thc 3x/week     Review of Systems:  Constitutional:  Denies fever or chills, malaise, weight changes.   Eyes:  Denies change in visual acuity   HENT:  Denies nasal congestion or sore throat   Respiratory:   Denies cough or shortness of breath   Cardiovascular:  Denies chest pain or edema   GI:  Denies abdominal pain, nausea, vomiting, bloody stools or diarrhea   :  Denies dysuria   Integument:  Denies rash   Neurologic:  As per HPI  MSK: Per above HPI  Endocrine:  Denies polyuria or polydipsia   Lymphatic:  Denies swollen glands   Psychiatric:  Denies depression or anxiety            PHYSICAL EXAM:  /82 (BP Location: Left arm, Patient Position: Sitting)   Pulse 88   Resp 20   LMP 10/14/2024 (Exact Date)     General:  NAD, well developed, F      Psychiatric: appropriate mood & affect.   Cardiovascular:  Normal pedal pulses; no LE edema.  Respiratory:  Normal rate; unlabored breathing.  Skin:  Intact; no erythema; no ecchymosis or rash.  Lymphatic:  No lymphadenopathy or lymphedema.  NEURO:  Alert and appropriate. Speech fluent, conversing appropriately.   Motor:    Rt: HF 5/5, KE 5/5, KF 5/5, DF 5/5, EHL 5/5, PF 5/5.    Lt: HF 5/5, KE 5/5, KF 5/5, DF 5/5, EHL 5/5, PF 5/5.  Sensation:     Light touch: intact in the b/l L3-S1 dermatomes.    PP: intact in the b/l L3-S1 dermatomes  Reflexes:     Right:  patellar 1+, achilles 1+,     Left: patellar 1+, achilles 1+,     Babinski's downgoing b/l; no clonus  Gait: Normal, narrow based gait.     MSK:  Inspection reveals no evidence of a pelvic obliqity   Spinal range of motion: Flexion to 80° degrees, Extension of 20 degrees.  There is tenderness over the paraspinals lower T and upper L on R.   Special tests:    Straight leg raise: + on R    Slump test: + on R    Facet loading: - bl          Impression: Nikole Almanza is a 19 y.o. F w pmh of anxiety, colitis, depression presenting with R lumbar radiculopathy pain.     Plan:  Orders Placed This Encounter   Procedures    XR lumbar spine complete 4+ views    1. Continue PT already started, laurita hep  2. Start gabapentin 300 mg bid, titration explained  3. Mobic prn though given gi issues/colitis encouraged to  stop for now until sees GI for further work up  4. Xr L spine and T spine  5. Fu 8 weeks  6. If minimal improvement will order mri   7. Encouraged to cut down and off vaping  8.work w pcp on weight loss       Thank you for the consultation.     Tamika Polk MD  Physical Medicine and Rehabilitation

## 2024-10-21 ENCOUNTER — HOSPITAL ENCOUNTER (OUTPATIENT)
Dept: RADIOLOGY | Facility: HOSPITAL | Age: 19
Discharge: HOME | End: 2024-10-21
Payer: COMMERCIAL

## 2024-10-21 DIAGNOSIS — M54.16 RIGHT LUMBAR RADICULOPATHY: ICD-10-CM

## 2024-10-21 DIAGNOSIS — M54.41 MIDLINE LOW BACK PAIN WITH RIGHT-SIDED SCIATICA, UNSPECIFIED CHRONICITY: ICD-10-CM

## 2024-10-21 PROCEDURE — 72110 X-RAY EXAM L-2 SPINE 4/>VWS: CPT | Performed by: RADIOLOGY

## 2024-10-21 PROCEDURE — 72072 X-RAY EXAM THORAC SPINE 3VWS: CPT | Performed by: RADIOLOGY

## 2024-10-21 PROCEDURE — 72110 X-RAY EXAM L-2 SPINE 4/>VWS: CPT

## 2024-10-21 PROCEDURE — 72072 X-RAY EXAM THORAC SPINE 3VWS: CPT

## 2024-11-05 ENCOUNTER — TREATMENT (OUTPATIENT)
Dept: PHYSICAL THERAPY | Facility: CLINIC | Age: 19
End: 2024-11-05
Payer: COMMERCIAL

## 2024-11-05 ENCOUNTER — APPOINTMENT (OUTPATIENT)
Dept: GASTROENTEROLOGY | Facility: CLINIC | Age: 19
End: 2024-11-05
Payer: COMMERCIAL

## 2024-11-05 DIAGNOSIS — S39.012A LUMBAR STRAIN, INITIAL ENCOUNTER: ICD-10-CM

## 2024-11-05 PROCEDURE — 97110 THERAPEUTIC EXERCISES: CPT | Mod: GP

## 2024-11-05 PROCEDURE — 97140 MANUAL THERAPY 1/> REGIONS: CPT | Mod: GP

## 2024-11-05 PROCEDURE — 97014 ELECTRIC STIMULATION THERAPY: CPT | Mod: GP

## 2024-11-05 NOTE — PROGRESS NOTES
"    Physical Therapy Treatment Note     Patient Name: Nikole Almanza \"Suki\"  MRN: [id number]  Today's Date: 11/5/2024     Time Calculation  Start Time: 1700  Stop Time: 1800  Time Calculation (min): 60 min  PT Modalities Time Entry  E-Stim (Unattended) Time Entry: 15  PT Therapeutic Procedures Time Entry  Manual Therapy Time Entry: 20  Therapeutic Exercise Time Entry: 25    Visit:  2/MN  Referred by: Marge Soto PA-C    Insurance: [facility]   Certification Dates:  10/7/2024 - 12/31/2024  Current Problem:   Problem List Items Addressed This Visit    None  Visit Diagnoses         Codes    Lumbar strain, initial encounter     S39.012A          1. Lumbar strain, initial encounter  Follow Up In Physical Therapy               Subjective:  Pt stated she went to PT for the same back pain issue in HS. She played softball & participated in marching band. Stated she was lifting heavy loads at work & had an onset of pain when she was leaving. Her pain was severe & it was difficult to sit in the car. There are times when her LBP will keep her up at night. She is unable to sleep on her back or Rt side due to pain.    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  The quality of the pain is described as aching. The pain radiates to the Rt foot. The pain is at a severity of 7/10. The symptoms are aggravated by bending "     Social Factors:  1-2 personal factors &/or comorbidities (depression, migraines)     Precautions: as tolerated        Objective     HEP Compliance:  100%       Palpation: + tenderness Rt low back, L2 - L5, PSIS    Outcome Measures: DORIS= 20%    Treatments:    Exercises  - Supine PPT,  2 x 10, 5 sec hold    SKTC, 3 x 15 sec hold (mild pain), Rt  - Supine LTR,  1 x 10, 5 sec hold, (B)  - *Supine Bridge,  2 x 10, 5 sec hold  - Prone Press Up,   1x 10, 5 sec hold (painful)     (*Pt reported to be beneficial)    Modalities:  IFC x 15 min, pt seated, 2 leads L4, L5, 1 lead Rt glute to Rt posterior knee    Manual:  UPA's Rt & CPA's L2 - L5, pt prone, Sacral mobs, rotational lumbar stretch (B), grade III mobs, Rt long axis distraction     XR Lumbar `10-21-24    Minimal/mild S shaped thoracic & lumbar scoliosis.  Thoracic & lumbar spine demonstrate no evidence of fracture or subluxation.  The disc spaces are normal.        Assessment:     Pt stated no change in pain since last visit.  She had decreased pain for a day maybe a day & a half after her 1st visit, but the pain returned.  She reports no change in pain since starting Gabapentin.  We discussed biomechanics at work.   Decreased symptoms post treatment.     Plan: Continue per POC & pt tolerance.  Frequency/duration: 1-2x/wk for 8 wks

## 2024-11-07 ENCOUNTER — TREATMENT (OUTPATIENT)
Dept: PHYSICAL THERAPY | Facility: CLINIC | Age: 19
End: 2024-11-07
Payer: COMMERCIAL

## 2024-11-07 DIAGNOSIS — S39.012A LUMBAR STRAIN, INITIAL ENCOUNTER: ICD-10-CM

## 2024-11-07 PROCEDURE — 97110 THERAPEUTIC EXERCISES: CPT | Mod: GP

## 2024-11-07 PROCEDURE — 97014 ELECTRIC STIMULATION THERAPY: CPT | Mod: GP

## 2024-11-07 PROCEDURE — 97140 MANUAL THERAPY 1/> REGIONS: CPT | Mod: GP

## 2024-11-07 NOTE — PROGRESS NOTES
"    Physical Therapy Treatment Note     Patient Name: Nikole Almanza \"Suki\"  MRN: [id number]  Today's Date: 11/7/2024     Time Calculation  Start Time: 1637  Stop Time: 1735  Time Calculation (min): 58 min  PT Modalities Time Entry  E-Stim (Unattended) Time Entry: 15  PT Therapeutic Procedures Time Entry  Manual Therapy Time Entry: 20  Therapeutic Exercise Time Entry: 23    Visit:  3/MN  Referred by: Marge Soto PA-C    Insurance: [facility]   Certification Dates:  10/7/2024 - 12/31/2024  Current Problem:   Problem List Items Addressed This Visit    None  Visit Diagnoses         Codes    Lumbar strain, initial encounter     S39.012A          1. Lumbar strain, initial encounter  Follow Up In Physical Therapy               Subjective:  Pt stated she hit her quota of 117 boxes today, 83 lbs was the heaviest lift.  She woke up with a lot of pain this morning but as the day went on, some of this dissipated.      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  The quality of the pain is described as aching. The pain radiates to the Rt foot. The pain is at a severity of 7/10. The symptoms are aggravated by bending     Social Factors:  1-2 personal factors &/or comorbidities (depression, migraines)     Precautions: as tolerated        Objective     HEP Compliance:  100%       Palpation: + tenderness Rt low back, L2 - L5, " PSIS    Outcome Measures: DORIS= 20%    Treatments:    Exercises  - Supine PPT,  2 x 10, 5 sec hold    SKTC, 3 x 15 sec hold (mild pain), Rt  - Supine LTR,  10 x each, 5 sec hold, (B)  - *Supine Bridge,  2 x 10, 5 sec hold  - Prone Press Up,   1x 10, 5 sec hold (painful)     (*Pt reported to be beneficial)    Supine Hamstring Stretch. 3 x 30 sec    Modalities:  IFC x 15 min, pt seated, 2 leads L4, L5, 1 lead Rt glute to Rt posterior knee    Manual:  UPA's Rt & CPA's L2 - L5, pt prone, Sacral mobs, rotational lumbar stretch (B), grade III mobs, Rt long axis distraction     XR Lumbar 10-21-24    Minimal/mild S shaped thoracic & lumbar scoliosis.  Thoracic & lumbar spine demonstrate no evidence of fracture or subluxation.  The disc spaces are normal.        Assessment:     Pt stated symptom relief after visits, but temporary.  She reports no change in pain since starting Gabapentin.  She reports performing gooed biomechanics at work.   Decreased symptoms post treatment.     Plan: Continue per POC & pt tolerance.  Frequency/duration: 1-2x/wk for 8 wks

## 2024-12-19 ENCOUNTER — OFFICE VISIT (OUTPATIENT)
Facility: HOSPITAL | Age: 19
End: 2024-12-19
Payer: COMMERCIAL

## 2024-12-19 VITALS — SYSTOLIC BLOOD PRESSURE: 123 MMHG | HEART RATE: 92 BPM | RESPIRATION RATE: 20 BRPM | DIASTOLIC BLOOD PRESSURE: 67 MMHG

## 2024-12-19 DIAGNOSIS — M54.16 RIGHT LUMBAR RADICULOPATHY: Primary | ICD-10-CM

## 2024-12-19 DIAGNOSIS — M47.816 LUMBAR SPONDYLOSIS: ICD-10-CM

## 2024-12-19 PROCEDURE — 99214 OFFICE O/P EST MOD 30 MIN: CPT | Performed by: PHYSICAL MEDICINE & REHABILITATION

## 2024-12-19 RX ORDER — GABAPENTIN 300 MG/1
300 CAPSULE ORAL 2 TIMES DAILY
Qty: 30 CAPSULE | Refills: 3 | Status: SHIPPED | OUTPATIENT
Start: 2024-12-19 | End: 2025-01-18

## 2024-12-19 ASSESSMENT — PAIN SCALES - GENERAL: PAINLEVEL_OUTOF10: 1

## 2024-12-19 NOTE — PROGRESS NOTES
Physical Medicine and Rehabilitation [facility]  12/19/24       Chief Complaint:  Low back pain    Referred by pcp     HPI:  Nikole Almanza is a  19 y.o. F ho presents to the clinic today  for evaluation of low back pain.  Onset: pain started in 2020 on and off, better therapy  Started flair ing up 6 weeks ago at work, not sure if she did anything  Location: R lower back  Radiation: buttock., back of thigh and calf into ankle, no issues w the ankle  Quality: sharp,tingling and numb  Sometimes weakness, few times gave out  Duration: comes and goes daily  Aggravating factors:  sitting, coughing  Alleviating factors: changing positions  Severity: 6  Numbness/Tingling: Yes -   Bowel or bladder incontinence: No  Pt's current medication regimen includes: mobic w mild relief  Lidocaine patches wo relief     Treatment to date:  Physical therapy: Yes - 3197-6141 w some relief  And now she has PT again  Medications taken to date for this complaint include the following:   As above  Prior injections: Yes -          She was last seen in October 2024. At that time we discussed:    1. Continue PT already started, [facility] hep  2. Start gabapentin 300 mg bid, titration explained  3. Mobic prn though given gi issues/colitis encouraged to stop for now until sees GI for further work up  4. Xr L spine and T spine  5. Fu 8 weeks  6. If minimal improvement will order mri   7. Encouraged to cut down and off vaping  8.work w pcp on weight loss      Completed 3 sessions of therapy, 80% better. Doing her HEP daily.  Per patient she did 8 sessions,.   Feels she is dping well w HEP.    Pain goes into the back of the knee,. This occurs every morning but less intense.  Takes gabapentin 300 mg at night, helps w sleep and during the day.     Imaging  Reviewed w patient and personally 12/19/24  Xr T and L spine 10/2024  IMPRESSION:  Mild scoliosis. Otherwise normal radiographs thoracic and lumbar  spine.         Past Medical History:    Diagnosis Date    Anxiety     Bipolar 1 disorder (Multi)     Depressed     GERD (gastroesophageal reflux disease)     IBS (irritable bowel syndrome)     OCD (obsessive compulsive disorder)         Past Surgical History:   Procedure Laterality Date    OTHER SURGICAL HISTORY  2007    polp removed    TONSILLECTOMY  06/2019    WISDOM TOOTH EXTRACTION  10/2020        Patient Active Problem List    Diagnosis Date Noted    Alternating constipation and diarrhea 06/25/2024    Right upper quadrant abdominal pain 06/25/2024    Generalized abdominal pain 06/25/2024    Class 3 severe obesity due to excess calories with body mass index (BMI) of 45.0 to 49.9 in adult 06/25/2024        Family History   Problem Relation Name Age of Onset    Hypertension Mother      Hypertension Father      Diabetes Father      Heart failure Father      Hypertension Maternal Grandfather      Stomach cancer Maternal Grandfather      Hypertension Paternal Grandmother      Diabetes Paternal Grandmother      Heart failure Paternal Grandmother      COPD Paternal Grandmother      Ovarian cancer Paternal Grandmother      Hypertension Paternal Grandfather      Diabetes Paternal Grandfather          Current Outpatient Medications   Medication Sig Dispense Refill    ARIPiprazole (Abilify) 15 mg tablet Take 0.5 tablets (7.5 mg) by mouth once daily.      Junel FE 1.5/30, 28, 1.5 mg-30 mcg (21)/75 mg (7) tablet       omeprazole (PriLOSEC) 20 mg DR capsule Take 1 capsule (20 mg) by mouth once daily in the morning. Take before meals.      sertraline (Zoloft) 25 mg tablet Take 1 tablet (25 mg) by mouth early in the morning..      gabapentin (Neurontin) 300 mg capsule Take 1 capsule (300 mg) by mouth 2 times a day. 60 capsule 3    pantoprazole (ProtoNix) 40 mg EC tablet Take 1 tablet (40 mg) by mouth once daily in the morning. Take before meals. Do not crush, chew, or split. 30 tablet 0     No current facility-administered medications for this visit.        No  Known Allergies     Social History     Socioeconomic History    Marital status: Significant Other   Tobacco Use    Smoking status: Every Day     Types: Cigarettes    Smokeless tobacco: Never    Tobacco comments:     vape   Vaping Use    Vaping status: Some Days   Substance and Sexual Activity    Alcohol use: Yes     Alcohol/week: 1.0 standard drink of alcohol     Types: 1 Cans of beer per week     Comment: occassional    Drug use: Yes     Types: Marijuana     Comment: very occassional   Lives w bf  Works in [employer], ,  Smokes vape in a week  1 drink/week  Thc 3x/week     Review of Systems:  Constitutional:  Denies fever or chills, malaise, weight changes.   Eyes:  Denies change in visual acuity   HENT:  Denies nasal congestion or sore throat   Respiratory:  Denies cough or shortness of breath   Cardiovascular:  Denies chest pain or edema   GI:  Denies abdominal pain, nausea, vomiting, bloody stools or diarrhea   :  Denies dysuria   Integument:  Denies rash   Neurologic:  As per HPI  MSK: Per above HPI  Endocrine:  Denies polyuria or polydipsia   Lymphatic:  Denies swollen glands   Psychiatric:  Denies depression or anxiety            PHYSICAL EXAM:  /67 (BP Location: Left arm, Patient Position: Sitting)   Pulse 92   Resp 20   LMP 12/02/2024 (Approximate)     General:  NAD, well developed, F      Psychiatric: appropriate mood & affect.   Cardiovascular:  Normal pedal pulses; no LE edema.  Respiratory:  Normal rate; unlabored breathing.  Skin:  Intact; no erythema; no ecchymosis or rash.  Lymphatic:  No lymphadenopathy or lymphedema.  NEURO:  Alert and appropriate. Speech fluent, conversing appropriately.   Motor:    Rt: HF 5/5, KE 5/5, KF 5/5, DF 5/5, EHL 5/5, PF 5/5.    Lt: HF 5/5, KE 5/5, KF 5/5, DF 5/5, EHL 5/5, PF 5/5.  Sensation:     Light touch: intact in the b/l L3-S1 dermatomes.    PP: intact in the b/l L3-S1 dermatomes  Reflexes:     Right:  patellar 1+, achilles 1+,     Left: patellar  1+, achilles 1+,     Babinski's downgoing b/l; no clonus  Gait: Normal, narrow based gait.     MSK:  Inspection reveals no evidence of a pelvic obliqity   Spinal range of motion: Flexion to 80° degrees, Extension of 20 degrees.  There is tenderness over the paraspinals lower T and upper L on R.   Special tests:    Straight leg raise: + on R    Slump test: + on R    Facet loading: - bl          Impression: Nikole Almanza is a 19 y.o. F w pmh of anxiety, colitis, depression presenting with R lumbar radiculopathy pain. Overall pain is much better w therapy and meds but still feels needs gabapentin.     Plan:     1. New therapy referral to continue hep core mkenzie protocol  2. Ok to continue gabapentin 300 mg just at night, goal in future to wean off; but for now ok to continue and provided refill   3. Mobic prn though given gi issues/colitis encouraged to stop for now until sees GI for further work up  4. Xr L spine and T spine w mold scoliosis T spine, L5/s1 on personal evaluated w mild degenerative changes  5. Fu 8 weeks  6. If minimal improvement will order mri   7. continue to cut down and off vaping  8.work w pcp on weight loss  9 fu still radicular symptoms at fu will consider MRI order       Tamika Polk MD  Physical Medicine and Rehabilitation

## 2024-12-30 ENCOUNTER — OFFICE VISIT (OUTPATIENT)
Dept: URGENT CARE | Age: 19
End: 2024-12-30
Payer: COMMERCIAL

## 2024-12-30 VITALS
HEART RATE: 99 BPM | DIASTOLIC BLOOD PRESSURE: 61 MMHG | SYSTOLIC BLOOD PRESSURE: 111 MMHG | TEMPERATURE: 97.7 F | OXYGEN SATURATION: 98 % | RESPIRATION RATE: 20 BRPM

## 2024-12-30 DIAGNOSIS — R68.89 FLU-LIKE SYMPTOMS: ICD-10-CM

## 2024-12-30 DIAGNOSIS — J40 BRONCHITIS: Primary | ICD-10-CM

## 2024-12-30 LAB
POC RAPID INFLUENZA A: NEGATIVE
POC RAPID INFLUENZA B: NEGATIVE
POC SARS-COV-2 AG BINAX: NORMAL

## 2024-12-30 PROCEDURE — 87804 INFLUENZA ASSAY W/OPTIC: CPT

## 2024-12-30 PROCEDURE — 99213 OFFICE O/P EST LOW 20 MIN: CPT

## 2024-12-30 PROCEDURE — 87811 SARS-COV-2 COVID19 W/OPTIC: CPT

## 2024-12-30 RX ORDER — AZITHROMYCIN 250 MG/1
TABLET, FILM COATED ORAL
Qty: 6 TABLET | Refills: 0 | Status: SHIPPED | OUTPATIENT
Start: 2024-12-30 | End: 2025-01-04

## 2024-12-30 RX ORDER — PREDNISONE 20 MG/1
40 TABLET ORAL DAILY
Qty: 10 TABLET | Refills: 0 | Status: SHIPPED | OUTPATIENT
Start: 2024-12-30 | End: 2025-01-04

## 2024-12-30 RX ORDER — ALBUTEROL SULFATE 90 UG/1
2 INHALANT RESPIRATORY (INHALATION) EVERY 4 HOURS PRN
Qty: 8.5 G | Refills: 0 | Status: SHIPPED | OUTPATIENT
Start: 2024-12-30 | End: 2025-12-30

## 2024-12-30 ASSESSMENT — ENCOUNTER SYMPTOMS
WHEEZING: 1
DIARRHEA: 0
VOMITING: 1
SORE THROAT: 1
CHEST TIGHTNESS: 0
FEVER: 0
PALPITATIONS: 0
RHINORRHEA: 1
TROUBLE SWALLOWING: 0
SHORTNESS OF BREATH: 0
NAUSEA: 1
CHILLS: 0
CONSTIPATION: 0
COUGH: 1

## 2024-12-30 NOTE — PATIENT INSTRUCTIONS
Recommended tylenol for any fevers    Mucinex over the counter for cough    Go to ER if chest pain or shortness of breath.

## 2024-12-30 NOTE — LETTER
December 30, 2024     Patient: Nkiole Almanza   YOB: 2005   Date of Visit: 12/30/2024       To Whom It May Concern:    Nikole Almanza was seen in my clinic on 12/30/2024. Please excuse Nikole for her absence from work until fever free for 24-48 hours and symptoms improved.          Sincerely,         Tasneem Shah PA-C        CC: No Recipients

## 2024-12-30 NOTE — PROGRESS NOTES
"Subjective   Patient ID: Nikole Almanza \"Lizzie" is a 19 y.o. female. They present today with a chief complaint of Cough, Nasal Congestion, Fever, Nausea, and Vomiting (Symptoms for 4-5 days).    History of Present Illness  Patient presents with 4 days of fever, cough, congestion, some nausea with episode of vomiting from post nasal drainage and coughing fits. Claims productive cough. Denies chest pain, sob. Denies history of asthma.           Past Medical History  Allergies as of 12/30/2024    (No Known Allergies)       (Not in a hospital admission)       Past Medical History:   Diagnosis Date    Anxiety     Bipolar 1 disorder (Multi)     Depressed     GERD (gastroesophageal reflux disease)     IBS (irritable bowel syndrome)     OCD (obsessive compulsive disorder)        Past Surgical History:   Procedure Laterality Date    OTHER SURGICAL HISTORY  2007    polp removed    TONSILLECTOMY  06/2019    WISDOM TOOTH EXTRACTION  10/2020        reports that she has been smoking cigarettes. She has never used smokeless tobacco. She reports current alcohol use of about 1.0 standard drink of alcohol per week. She reports current drug use. Drug: Marijuana.    Review of Systems  Review of Systems   Constitutional:  Negative for chills and fever.   HENT:  Positive for congestion, postnasal drip, rhinorrhea and sore throat. Negative for ear pain and trouble swallowing.    Respiratory:  Positive for cough and wheezing. Negative for chest tightness and shortness of breath.    Cardiovascular:  Negative for chest pain and palpitations.   Gastrointestinal:  Positive for nausea and vomiting. Negative for constipation and diarrhea.   Skin:  Negative for rash.                                  Objective    Vitals:    12/30/24 1036   BP: 111/61   Pulse: 99   Resp: 20   Temp: 36.5 °C (97.7 °F)   SpO2: 98%     Patient's last menstrual period was 12/02/2024 (approximate).    Physical Exam  Constitutional:       General: She is not in " acute distress.     Appearance: She is not toxic-appearing.   HENT:      Right Ear: Tympanic membrane and external ear normal.      Left Ear: Tympanic membrane and external ear normal.      Nose: Congestion present.      Right Sinus: No frontal sinus tenderness.      Left Sinus: No frontal sinus tenderness.      Mouth/Throat:      Mouth: Mucous membranes are moist. No oral lesions.      Pharynx: Postnasal drip present. No oropharyngeal exudate or posterior oropharyngeal erythema.   Eyes:      Pupils: Pupils are equal, round, and reactive to light.   Cardiovascular:      Rate and Rhythm: Normal rate and regular rhythm.   Pulmonary:      Effort: Pulmonary effort is normal. No respiratory distress.      Breath sounds: Examination of the right-upper field reveals wheezing. Examination of the left-upper field reveals wheezing. Examination of the right-middle field reveals wheezing. Examination of the left-middle field reveals wheezing. Examination of the right-lower field reveals wheezing. Examination of the left-lower field reveals wheezing. Wheezing present.      Comments: Moderate expiratory wheeze, diffuse and b/l on auscultation.   Musculoskeletal:      Cervical back: Normal range of motion.   Neurological:      Mental Status: She is alert.         Procedures    Point of Care Test & Imaging Results from this visit  Results for orders placed or performed in visit on 12/30/24   POCT Influenza A/B manually resulted   Result Value Ref Range    POC Rapid Influenza A Negative Negative    POC Rapid Influenza B Negative Negative   POCT Covid-19 Rapid Antigen   Result Value Ref Range    POC ANGELIQUE-COV-2 AG  Presumptive negative test for SARS-CoV-2 (no antigen detected)     Presumptive negative test for SARS-CoV-2 (no antigen detected)      No results found.    Diagnostic study results (if any) were reviewed by Tasneem Shah PA-C.    Assessment/Plan   Allergies, medications, history, and pertinent labs/EKGs/Imaging reviewed  by Tasneem Shah PA-C.     Medical Decision Making  MDM- History and exam consistent with acute bronchitis. No evidence of pneumonia, sepsis or other acute cardiopulmonary pathology. Based on current exam I don't feel that imaging, labs, or further work up are warranted at this point. Based on current exam and past medical history, plan is for antibiotics and symptomatic therapies. Patient advised to return to clinic or go to the ED if symptoms change or worsen. Patient verbalized understanding and agrees with plan.       Orders and Diagnoses  Diagnoses and all orders for this visit:  Bronchitis  -     POCT Influenza A/B manually resulted  -     POCT Covid-19 Rapid Antigen  -     predniSONE (Deltasone) 20 mg tablet; Take 2 tablets (40 mg) by mouth once daily for 5 days.  -     albuterol (ProAir HFA) 90 mcg/actuation inhaler; Inhale 2 puffs every 4 hours if needed for wheezing or shortness of breath.  -     azithromycin (Zithromax) 250 mg tablet; Take 2 tabs (500 mg) by mouth today, than 1 daily for 4 days.  Flu-like symptoms  -     POCT Influenza A/B manually resulted  -     POCT Covid-19 Rapid Antigen      Medical Admin Record      Patient disposition: Home    Electronically signed by Tasneem Shah PA-C  10:47 AM

## 2025-01-08 ENCOUNTER — OFFICE VISIT (OUTPATIENT)
Dept: URGENT CARE | Age: 20
End: 2025-01-08
Payer: COMMERCIAL

## 2025-01-08 VITALS
HEART RATE: 87 BPM | RESPIRATION RATE: 16 BRPM | WEIGHT: 265 LBS | BODY MASS INDEX: 42.59 KG/M2 | SYSTOLIC BLOOD PRESSURE: 117 MMHG | DIASTOLIC BLOOD PRESSURE: 74 MMHG | HEIGHT: 66 IN | TEMPERATURE: 98.1 F | OXYGEN SATURATION: 98 %

## 2025-01-08 DIAGNOSIS — B34.9 VIRAL ILLNESS: ICD-10-CM

## 2025-01-08 DIAGNOSIS — R11.0 NAUSEA: Primary | ICD-10-CM

## 2025-01-08 PROCEDURE — 3008F BODY MASS INDEX DOCD: CPT

## 2025-01-08 PROCEDURE — 99213 OFFICE O/P EST LOW 20 MIN: CPT

## 2025-01-08 RX ORDER — ONDANSETRON 4 MG/1
4 TABLET, FILM COATED ORAL EVERY 8 HOURS PRN
Qty: 10 TABLET | Refills: 0 | Status: SHIPPED | OUTPATIENT
Start: 2025-01-08 | End: 2025-01-15

## 2025-01-08 ASSESSMENT — ENCOUNTER SYMPTOMS
VOMITING: 1
CHILLS: 0
PALPITATIONS: 0
WHEEZING: 0
FEVER: 0
SHORTNESS OF BREATH: 0
DIARRHEA: 1
SORE THROAT: 1
CONSTIPATION: 0
NAUSEA: 1
RHINORRHEA: 1
TROUBLE SWALLOWING: 0
COUGH: 1
CHEST TIGHTNESS: 0

## 2025-01-08 NOTE — PROGRESS NOTES
"Subjective   Patient ID: Nikole Almanza \"Lizzie" is a 19 y.o. female. They present today with a chief complaint of Vomiting (X 2 days).    History of Present Illness  Patient presents with ongoing vomiting and diarrhea following previous visit. Patient has had improvement of wheezing and coughing fits. Explains that she has nausea and has had difficulty keeping down water. Denies abd pain. Denies fever, chills, sweats. Denies n/v/d. Denies chest pain, sob. Patient completed zpak and steroid.             Past Medical History  Allergies as of 01/08/2025    (No Known Allergies)       (Not in a hospital admission)       Past Medical History:   Diagnosis Date    Anxiety     Bipolar 1 disorder (Multi)     Depressed     GERD (gastroesophageal reflux disease)     IBS (irritable bowel syndrome)     OCD (obsessive compulsive disorder)        Past Surgical History:   Procedure Laterality Date    OTHER SURGICAL HISTORY  2007    polp removed    TONSILLECTOMY  06/2019    WISDOM TOOTH EXTRACTION  10/2020        reports that she has been smoking cigarettes. She has never used smokeless tobacco. She reports current alcohol use of about 1.0 standard drink of alcohol per week. She reports current drug use. Drug: Marijuana.    Review of Systems  Review of Systems   Constitutional:  Negative for chills and fever.   HENT:  Positive for congestion, postnasal drip, rhinorrhea and sore throat. Negative for ear pain and trouble swallowing.    Respiratory:  Positive for cough. Negative for chest tightness, shortness of breath and wheezing.    Cardiovascular:  Negative for chest pain and palpitations.   Gastrointestinal:  Positive for diarrhea, nausea and vomiting. Negative for constipation.   Skin:  Negative for rash.                                  Objective    Vitals:    01/08/25 0931   BP: 117/74   Pulse: 87   Resp: 16   Temp: 36.7 °C (98.1 °F)   SpO2: 98%   Weight: 120 kg (265 lb)   Height: 1.676 m (5' 6\")     Patient's last " menstrual period was 01/08/2025 (approximate).    Physical Exam  Constitutional:       General: She is not in acute distress.     Appearance: She is not toxic-appearing.   HENT:      Right Ear: Tympanic membrane and external ear normal.      Left Ear: Tympanic membrane and external ear normal.      Nose: Congestion present.      Mouth/Throat:      Mouth: Mucous membranes are moist.      Pharynx: No oropharyngeal exudate or posterior oropharyngeal erythema.   Eyes:      Pupils: Pupils are equal, round, and reactive to light.   Pulmonary:      Effort: Pulmonary effort is normal. No respiratory distress.      Breath sounds: Normal breath sounds. No wheezing.   Abdominal:      General: Abdomen is flat. There is no distension.      Palpations: Abdomen is soft.      Tenderness: There is no abdominal tenderness. There is no right CVA tenderness, left CVA tenderness, guarding or rebound.   Neurological:      Mental Status: She is alert.         Procedures    Point of Care Test & Imaging Results from this visit  No results found for this visit on 01/08/25.   No results found.    Diagnostic study results (if any) were reviewed by Tasneem Shah PA-C.    Assessment/Plan   Allergies, medications, history, and pertinent labs/EKGs/Imaging reviewed by Tasneem Shah PA-C.     Medical Decision Making  MDM- History and exam consistent with improvement of bronchitis that she was seen for on previous visit but has new vomiting and diarrhea. Symptoms are all suspicious of viral infection. No evidence of pneumonia, sepsis or other acute cardiopulmonary pathology.  No evidence of acute abdomen.  Based on current exam I don't feel that imaging, labs, or further work up are warranted at this point. Based on current exam and past medical history, plan is for zofran and close monitoring of symptoms. Recommended brat diet and slowly introducing fluids.  Patient advised to return to clinic or go to the ED if symptoms change or worsen  or if no improvement in 24-48 hours. Patient verbalized understanding and agrees with plan. Work note provided      Orders and Diagnoses  Diagnoses and all orders for this visit:  Nausea  -     ondansetron (Zofran) 4 mg tablet; Take 1 tablet (4 mg) by mouth every 8 hours if needed for nausea or vomiting for up to 7 days.  Viral illness      Medical Admin Record      Patient disposition: Home    Electronically signed by Tasneem Shah PA-C  9:43 AM

## 2025-01-08 NOTE — LETTER
January 8, 2025     Patient: Nikole Almanza   YOB: 2005   Date of Visit: 1/8/2025       To Whom It May Concern:    Nikole Almanza was seen in my clinic on 1/5/2025 and 1/8/2025. Please excuse Nikole for her absence from work. She may return if symptoms improved and fever free over the next 24-48 hours.         Sincerely,         Tasneem Shah PA-C        CC: No Recipients

## 2025-01-08 NOTE — PATIENT INSTRUCTIONS
Continue increasing fluids and Brat diet (bland, toast, applesauce, bananas)    If no improvement in 48-72 hours, go to ER    Monitor fever    Start antidiarrhea medication as prescribed.     If you are unable to tolerate fluids by mouth go to ER with medication.     Continue albuterol as needed.

## 2025-02-20 ENCOUNTER — OFFICE VISIT (OUTPATIENT)
Dept: URGENT CARE | Age: 20
End: 2025-02-20
Payer: COMMERCIAL

## 2025-02-20 ENCOUNTER — ANCILLARY PROCEDURE (OUTPATIENT)
Dept: URGENT CARE | Age: 20
End: 2025-02-20
Payer: COMMERCIAL

## 2025-02-20 VITALS
SYSTOLIC BLOOD PRESSURE: 109 MMHG | OXYGEN SATURATION: 99 % | HEART RATE: 93 BPM | DIASTOLIC BLOOD PRESSURE: 63 MMHG | RESPIRATION RATE: 18 BRPM | TEMPERATURE: 98.3 F

## 2025-02-20 DIAGNOSIS — R05.1 ACUTE COUGH: ICD-10-CM

## 2025-02-20 DIAGNOSIS — J06.9 VIRAL URI: Primary | ICD-10-CM

## 2025-02-20 DIAGNOSIS — F17.200 NICOTINE USE DISORDER: ICD-10-CM

## 2025-02-20 DIAGNOSIS — R68.89 FLU-LIKE SYMPTOMS: ICD-10-CM

## 2025-02-20 LAB
POC RAPID INFLUENZA A: NEGATIVE
POC RAPID INFLUENZA B: NEGATIVE
POC SARS-COV-2 AG BINAX: NORMAL

## 2025-02-20 PROCEDURE — 71046 X-RAY EXAM CHEST 2 VIEWS: CPT | Performed by: NURSE PRACTITIONER

## 2025-02-20 RX ORDER — IPRATROPIUM BROMIDE AND ALBUTEROL SULFATE 2.5; .5 MG/3ML; MG/3ML
3 SOLUTION RESPIRATORY (INHALATION) ONCE
Status: COMPLETED | OUTPATIENT
Start: 2025-02-20 | End: 2025-02-20

## 2025-02-20 RX ORDER — ALBUTEROL SULFATE 90 UG/1
2 INHALANT RESPIRATORY (INHALATION) EVERY 4 HOURS PRN
Qty: 8.5 G | Refills: 0 | Status: SHIPPED | OUTPATIENT
Start: 2025-02-20 | End: 2026-02-20

## 2025-02-20 RX ORDER — BENZONATATE 200 MG/1
200 CAPSULE ORAL 3 TIMES DAILY PRN
Qty: 30 CAPSULE | Refills: 0 | Status: SHIPPED | OUTPATIENT
Start: 2025-02-20 | End: 2025-03-02

## 2025-02-20 RX ADMIN — IPRATROPIUM BROMIDE AND ALBUTEROL SULFATE 3 ML: 2.5; .5 SOLUTION RESPIRATORY (INHALATION) at 12:34

## 2025-02-20 ASSESSMENT — ENCOUNTER SYMPTOMS
SHORTNESS OF BREATH: 0
WHEEZING: 1
COUGH: 1
HEADACHES: 1
PSYCHIATRIC NEGATIVE: 1
RHINORRHEA: 1
FATIGUE: 1
CARDIOVASCULAR NEGATIVE: 1

## 2025-02-20 NOTE — PATIENT INSTRUCTIONS
Sending to [facility] for CXR    History and examination consistent with viral  illness - CXR negative for pneumonia, pt notified.  No evidence of sepsis, strep,  pneumonia, otitis, bacterial sinusitis or other bacterial infection. Patient counseled on  supportive measures at home. Improved after nebulizer treatment in office-albuterol sent, Benzonatate for cough.  Rest, hydration, OTC Sinus/Cold, Vicks vaporub, vaporizer in bedroom at night, tylenol/motrin for pain/fever  Patient is encouraged to return to clinic if symptoms change or  worsen and will otherwise follow with PCP.   PCP referral entered  Advised that it is important to stop smoking/vaping, should work to cut back/quitting.

## 2025-02-20 NOTE — PROGRESS NOTES
"Subjective   Patient ID: Nikole Almanza \"Lizzie" is a 19 y.o. female. They present today with a chief complaint of Cough (Pt advised that she has had a productive cough with chest congestion for the past 1 1/2 weeks. Pt also advised that she woke up today with nasal congestion, headache, and diaphoresis. ). Uses Nicotine Vape-about 1 per week        Past Medical History  Allergies as of 02/20/2025    (No Known Allergies)       (Not in a hospital admission)       Past Medical History:   Diagnosis Date    Anxiety     Bipolar 1 disorder (Multi)     Depressed     GERD (gastroesophageal reflux disease)     IBS (irritable bowel syndrome)     OCD (obsessive compulsive disorder)        Past Surgical History:   Procedure Laterality Date    OTHER SURGICAL HISTORY  2007    polp removed    TONSILLECTOMY  06/2019    WISDOM TOOTH EXTRACTION  10/2020        reports that she has been smoking cigarettes. She has never used smokeless tobacco. She reports current alcohol use of about 1.0 standard drink of alcohol per week. She reports current drug use. Drug: Marijuana.    Review of Systems  Review of Systems   Constitutional:  Positive for fatigue.   HENT:  Positive for congestion, postnasal drip and rhinorrhea.    Respiratory:  Positive for cough and wheezing. Negative for shortness of breath.    Cardiovascular: Negative.    Neurological:  Positive for headaches.   Psychiatric/Behavioral: Negative.                                    Objective    Vitals:    02/20/25 1200   BP: 109/63   Pulse: 93   Resp: 18   Temp: 36.8 °C (98.3 °F)   SpO2: 99%     No LMP recorded.    Physical Exam  Constitutional:       Appearance: She is obese.   HENT:      Right Ear: Tympanic membrane, ear canal and external ear normal.      Left Ear: Tympanic membrane, ear canal and external ear normal.   Cardiovascular:      Rate and Rhythm: Normal rate and regular rhythm.      Pulses: Normal pulses.   Pulmonary:      Effort: Pulmonary effort is normal.      " Breath sounds: Wheezing (Coarse wheeze throughout all fields, much improved after nebulier treatment) present.   Musculoskeletal:         General: Normal range of motion.   Skin:     General: Skin is warm and dry.      Capillary Refill: Capillary refill takes less than 2 seconds.   Neurological:      General: No focal deficit present.      Mental Status: She is alert and oriented to person, place, and time.   Psychiatric:         Mood and Affect: Mood normal.         Behavior: Behavior normal.         Thought Content: Thought content normal.         Judgment: Judgment normal.         Procedures    Point of Care Test & Imaging Results from this visit  Results for orders placed or performed in visit on 02/20/25   POCT Covid-19 Rapid Antigen   Result Value Ref Range    POC ANGELIQUE-COV-2 AG  Presumptive negative test for SARS-CoV-2 (no antigen detected)     Presumptive negative test for SARS-CoV-2 (no antigen detected)   POCT Influenza A/B manually resulted   Result Value Ref Range    POC Rapid Influenza A Negative Negative    POC Rapid Influenza B Negative Negative      XR chest 2 views    Result Date: 2/20/2025  Interpreted By:  Kwadwo Butcher, STUDY: XR CHEST 2 VIEWS  2/20/2025 1:17 pm   INDICATION: Signs/Symptoms:r/o pneumonia   COMPARISON: None.   ACCESSION NUMBER(S): [id number]   ORDERING CLINICIAN: CHAITANYA REN   TECHNIQUE: PA and lateral views of the chest were obtained.   FINDINGS: No focal infiltrate, pleural effusion or pneumothorax is identified. The cardiac silhouette is within normal limits for size.       No focal infiltrate or pneumothorax.   MACRO: None.   Signed by: Kwadwo Butcher 2/20/2025 1:24 PM Dictation workstation:   [id number]     Diagnostic study results (if any) were reviewed by LUIZ Fournier.    Assessment/Plan   Allergies, medications, history, and pertinent labs/EKGs/Imaging reviewed by LUIZ Fournier.     Medical Decision Making  Sending to [facility] for  CXR    History and examination consistent with viral  illness - CXR negative for pneumonia, pt notified.  No evidence of sepsis, strep,  pneumonia, otitis, bacterial sinusitis or other bacterial infection. Patient counseled on  supportive measures at home. Improved after nebulizer treatment in office-albuterol sent, Benzonatate for cough.  Rest, hydration, OTC Sinus/Cold, Vicks vaporub, vaporizer in bedroom at night, tylenol/motrin for pain/fever  Patient is encouraged to return to clinic if symptoms change or  worsen and will otherwise follow with PCP.   PCP referral entered  Advised that it is important to stop smoking/vaping, should work to cut back/quitting.    Orders and Diagnoses  Diagnoses and all orders for this visit:  Viral URI  -     benzonatate (Tessalon) 200 mg capsule; Take 1 capsule (200 mg) by mouth 3 times a day as needed for cough for up to 10 days. Do not crush or chew.  -     albuterol (ProAir HFA) 90 mcg/actuation inhaler; Inhale 2 puffs every 4 hours if needed for wheezing or shortness of breath.  Flu-like symptoms  -     POCT Covid-19 Rapid Antigen  -     POCT Influenza A/B manually resulted  Acute cough  -     ipratropium-albuteroL (Duo-Neb) 0.5-2.5 mg/3 mL nebulizer solution 3 mL  -     XR chest 2 views; Future  Nicotine use disorder  -     Referral to Primary Care; Future      Medical Admin Record  Administrations This Visit       ipratropium-albuteroL (Duo-Neb) 0.5-2.5 mg/3 mL nebulizer solution 3 mL       Admin Date  02/20/2025 Action  Given Dose  3 mL Route  nebulization Documented By  Ramon Ewing MA                    Patient disposition: Home    Electronically signed by ANTWAN Fournier-ADRINANE  2:57 PM

## 2025-04-04 ENCOUNTER — DOCUMENTATION (OUTPATIENT)
Dept: PHYSICAL THERAPY | Facility: CLINIC | Age: 20
End: 2025-04-04
Payer: COMMERCIAL

## 2025-04-04 NOTE — PROGRESS NOTES
"        Physical Therapy     Discharge Summary    Name: Nikole Almanza \"Suki\"  MRN: [id number]  : 2005  Date: 25    Discharge Summary: PT    Discharge Information: Date of evaluation 10-7-24    Therapy Summary: Pt seen for 3 visits for Rt lumbar radiculopathy.  She never returned for follow up care.        Rehab Discharge Reason: Failed to schedule and/or keep follow-up appointment(s)  "

## 2025-06-04 ENCOUNTER — OFFICE VISIT (OUTPATIENT)
Dept: URGENT CARE | Age: 20
End: 2025-06-04
Payer: COMMERCIAL

## 2025-06-04 ENCOUNTER — HOSPITAL ENCOUNTER (EMERGENCY)
Facility: HOSPITAL | Age: 20
Discharge: HOME | End: 2025-06-04
Attending: STUDENT IN AN ORGANIZED HEALTH CARE EDUCATION/TRAINING PROGRAM
Payer: COMMERCIAL

## 2025-06-04 ENCOUNTER — APPOINTMENT (OUTPATIENT)
Dept: RADIOLOGY | Facility: HOSPITAL | Age: 20
End: 2025-06-04
Payer: COMMERCIAL

## 2025-06-04 VITALS
DIASTOLIC BLOOD PRESSURE: 80 MMHG | HEART RATE: 82 BPM | SYSTOLIC BLOOD PRESSURE: 130 MMHG | OXYGEN SATURATION: 98 % | RESPIRATION RATE: 16 BRPM

## 2025-06-04 VITALS
HEIGHT: 66 IN | RESPIRATION RATE: 18 BRPM | SYSTOLIC BLOOD PRESSURE: 126 MMHG | DIASTOLIC BLOOD PRESSURE: 69 MMHG | WEIGHT: 280 LBS | TEMPERATURE: 98.5 F | OXYGEN SATURATION: 98 % | BODY MASS INDEX: 45 KG/M2 | HEART RATE: 71 BPM

## 2025-06-04 DIAGNOSIS — R10.2 PELVIC PAIN: Primary | ICD-10-CM

## 2025-06-04 DIAGNOSIS — R10.9 ABDOMINAL PAIN IN EARLY PREGNANCY: ICD-10-CM

## 2025-06-04 DIAGNOSIS — O26.899 ABDOMINAL PAIN IN EARLY PREGNANCY: ICD-10-CM

## 2025-06-04 DIAGNOSIS — R10.9 ABDOMINAL CRAMPING: ICD-10-CM

## 2025-06-04 DIAGNOSIS — Z3A.01 6 WEEKS GESTATION OF PREGNANCY (HHS-HCC): ICD-10-CM

## 2025-06-04 LAB
ABO GROUP (TYPE) IN BLOOD: NORMAL
ALBUMIN SERPL BCP-MCNC: 4.4 G/DL (ref 3.4–5)
ALP SERPL-CCNC: 63 U/L (ref 33–110)
ALT SERPL W P-5'-P-CCNC: 11 U/L (ref 7–45)
ANION GAP SERPL CALC-SCNC: 15 MMOL/L (ref 10–20)
ANTIBODY SCREEN: NORMAL
APPEARANCE UR: ABNORMAL
AST SERPL W P-5'-P-CCNC: 13 U/L (ref 9–39)
B-HCG SERPL-ACNC: ABNORMAL MIU/ML
BASOPHILS # BLD AUTO: 0.04 X10*3/UL (ref 0–0.1)
BASOPHILS NFR BLD AUTO: 0.4 %
BILIRUB SERPL-MCNC: 0.6 MG/DL (ref 0–1.2)
BILIRUB UR STRIP.AUTO-MCNC: NEGATIVE MG/DL
BUN SERPL-MCNC: 8 MG/DL (ref 6–23)
CALCIUM SERPL-MCNC: 9.4 MG/DL (ref 8.6–10.3)
CHLORIDE SERPL-SCNC: 105 MMOL/L (ref 98–107)
CO2 SERPL-SCNC: 20 MMOL/L (ref 21–32)
COLOR UR: YELLOW
CREAT SERPL-MCNC: 0.65 MG/DL (ref 0.5–1.05)
EGFRCR SERPLBLD CKD-EPI 2021: >90 ML/MIN/1.73M*2
EOSINOPHIL # BLD AUTO: 0.09 X10*3/UL (ref 0–0.7)
EOSINOPHIL NFR BLD AUTO: 0.9 %
ERYTHROCYTE [DISTWIDTH] IN BLOOD BY AUTOMATED COUNT: 12.7 % (ref 11.5–14.5)
GLUCOSE SERPL-MCNC: 88 MG/DL (ref 74–99)
GLUCOSE UR STRIP.AUTO-MCNC: NORMAL MG/DL
HCG UR QL IA.RAPID: POSITIVE
HCT VFR BLD AUTO: 44.3 % (ref 36–46)
HGB BLD-MCNC: 14.2 G/DL (ref 12–16)
IMM GRANULOCYTES # BLD AUTO: 0.03 X10*3/UL (ref 0–0.7)
IMM GRANULOCYTES NFR BLD AUTO: 0.3 % (ref 0–0.9)
KETONES UR STRIP.AUTO-MCNC: NEGATIVE MG/DL
LEUKOCYTE ESTERASE UR QL STRIP.AUTO: ABNORMAL
LYMPHOCYTES # BLD AUTO: 2.25 X10*3/UL (ref 1.2–4.8)
LYMPHOCYTES NFR BLD AUTO: 21.7 %
MCH RBC QN AUTO: 29 PG (ref 26–34)
MCHC RBC AUTO-ENTMCNC: 32.1 G/DL (ref 32–36)
MCV RBC AUTO: 91 FL (ref 80–100)
MONOCYTES # BLD AUTO: 0.63 X10*3/UL (ref 0.1–1)
MONOCYTES NFR BLD AUTO: 6.1 %
MUCOUS THREADS #/AREA URNS AUTO: ABNORMAL /LPF
NEUTROPHILS # BLD AUTO: 7.31 X10*3/UL (ref 1.2–7.7)
NEUTROPHILS NFR BLD AUTO: 70.6 %
NITRITE UR QL STRIP.AUTO: NEGATIVE
NRBC BLD-RTO: 0 /100 WBCS (ref 0–0)
PH UR STRIP.AUTO: 6.5 [PH]
PLATELET # BLD AUTO: 309 X10*3/UL (ref 150–450)
POC APPEARANCE, URINE: ABNORMAL
POC BILIRUBIN, URINE: NEGATIVE
POC BLOOD, URINE: NEGATIVE
POC COLOR, URINE: YELLOW
POC GLUCOSE, URINE: NEGATIVE MG/DL
POC KETONES, URINE: NEGATIVE MG/DL
POC LEUKOCYTES, URINE: NEGATIVE
POC NITRITE,URINE: NEGATIVE
POC PH, URINE: 6.5 PH
POC PROTEIN, URINE: NEGATIVE MG/DL
POC SPECIFIC GRAVITY, URINE: 1.02
POC UROBILINOGEN, URINE: 0.2 EU/DL
POTASSIUM SERPL-SCNC: 3.7 MMOL/L (ref 3.5–5.3)
PREGNANCY TEST URINE, POC: POSITIVE
PROT SERPL-MCNC: 7 G/DL (ref 6.4–8.2)
PROT UR STRIP.AUTO-MCNC: ABNORMAL MG/DL
RBC # BLD AUTO: 4.89 X10*6/UL (ref 4–5.2)
RBC # UR STRIP.AUTO: NEGATIVE MG/DL
RBC #/AREA URNS AUTO: ABNORMAL /HPF
RH FACTOR (ANTIGEN D): NORMAL
SODIUM SERPL-SCNC: 136 MMOL/L (ref 136–145)
SP GR UR STRIP.AUTO: 1.03
SQUAMOUS #/AREA URNS AUTO: ABNORMAL /HPF
UROBILINOGEN UR STRIP.AUTO-MCNC: NORMAL MG/DL
WBC # BLD AUTO: 10.4 X10*3/UL (ref 4.4–11.3)
WBC #/AREA URNS AUTO: ABNORMAL /HPF

## 2025-06-04 PROCEDURE — 76801 OB US < 14 WKS SINGLE FETUS: CPT

## 2025-06-04 PROCEDURE — 96361 HYDRATE IV INFUSION ADD-ON: CPT

## 2025-06-04 PROCEDURE — 84702 CHORIONIC GONADOTROPIN TEST: CPT | Performed by: NURSE PRACTITIONER

## 2025-06-04 PROCEDURE — 86901 BLOOD TYPING SEROLOGIC RH(D): CPT | Performed by: NURSE PRACTITIONER

## 2025-06-04 PROCEDURE — 76801 OB US < 14 WKS SINGLE FETUS: CPT | Performed by: RADIOLOGY

## 2025-06-04 PROCEDURE — 85025 COMPLETE CBC W/AUTO DIFF WBC: CPT | Performed by: NURSE PRACTITIONER

## 2025-06-04 PROCEDURE — 86803 HEPATITIS C AB TEST: CPT | Mod: AHULAB | Performed by: NURSE PRACTITIONER

## 2025-06-04 PROCEDURE — 80053 COMPREHEN METABOLIC PANEL: CPT | Performed by: NURSE PRACTITIONER

## 2025-06-04 PROCEDURE — 81001 URINALYSIS AUTO W/SCOPE: CPT | Performed by: NURSE PRACTITIONER

## 2025-06-04 PROCEDURE — 96374 THER/PROPH/DIAG INJ IV PUSH: CPT

## 2025-06-04 PROCEDURE — 99284 EMERGENCY DEPT VISIT MOD MDM: CPT | Mod: 25 | Performed by: STUDENT IN AN ORGANIZED HEALTH CARE EDUCATION/TRAINING PROGRAM

## 2025-06-04 PROCEDURE — 81025 URINE PREGNANCY TEST: CPT | Performed by: NURSE PRACTITIONER

## 2025-06-04 PROCEDURE — 86780 TREPONEMA PALLIDUM: CPT | Mod: AHULAB | Performed by: NURSE PRACTITIONER

## 2025-06-04 PROCEDURE — 87389 HIV-1 AG W/HIV-1&-2 AB AG IA: CPT | Mod: AHULAB | Performed by: NURSE PRACTITIONER

## 2025-06-04 PROCEDURE — 81003 URINALYSIS AUTO W/O SCOPE: CPT | Performed by: NURSE PRACTITIONER

## 2025-06-04 PROCEDURE — 86900 BLOOD TYPING SEROLOGIC ABO: CPT | Performed by: NURSE PRACTITIONER

## 2025-06-04 PROCEDURE — 36415 COLL VENOUS BLD VENIPUNCTURE: CPT | Performed by: NURSE PRACTITIONER

## 2025-06-04 PROCEDURE — 76817 TRANSVAGINAL US OBSTETRIC: CPT | Performed by: RADIOLOGY

## 2025-06-04 PROCEDURE — 2500000004 HC RX 250 GENERAL PHARMACY W/ HCPCS (ALT 636 FOR OP/ED): Performed by: NURSE PRACTITIONER

## 2025-06-04 PROCEDURE — 87086 URINE CULTURE/COLONY COUNT: CPT | Mod: AHULAB | Performed by: NURSE PRACTITIONER

## 2025-06-04 RX ORDER — ONDANSETRON HYDROCHLORIDE 2 MG/ML
4 INJECTION, SOLUTION INTRAVENOUS ONCE
Status: COMPLETED | OUTPATIENT
Start: 2025-06-04 | End: 2025-06-04

## 2025-06-04 RX ADMIN — ONDANSETRON 4 MG: 2 INJECTION, SOLUTION INTRAMUSCULAR; INTRAVENOUS at 16:41

## 2025-06-04 RX ADMIN — SODIUM CHLORIDE 1000 ML: 0.9 INJECTION, SOLUTION INTRAVENOUS at 16:41

## 2025-06-04 ASSESSMENT — ENCOUNTER SYMPTOMS
NEUROLOGICAL NEGATIVE: 1
HEMATURIA: 0
VOMITING: 1
CONSTITUTIONAL NEGATIVE: 1
DYSURIA: 0
ABDOMINAL PAIN: 1
CARDIOVASCULAR NEGATIVE: 1
RESPIRATORY NEGATIVE: 1
NAUSEA: 1
PSYCHIATRIC NEGATIVE: 1

## 2025-06-04 ASSESSMENT — PAIN SCALES - GENERAL: PAINLEVEL_OUTOF10: 6

## 2025-06-04 ASSESSMENT — PAIN - FUNCTIONAL ASSESSMENT: PAIN_FUNCTIONAL_ASSESSMENT: 0-10

## 2025-06-04 NOTE — PATIENT INSTRUCTIONS
Pregnant female presents with abdominal pain that cannot be properly assessed in office. Patient referred to ED for further evaluation and testing r/o ectopic pregnancy-other pathology.  Dr Siddiqui notified per [facility] Policy

## 2025-06-04 NOTE — LETTER
June 4, 2025     Patient: Nikole Almanza   YOB: 2005   Date of Visit: 6/4/2025       To Whom It May Concern:    It is my medical opinion that Nikole Almanza {Work release (duty restriction):[id number]}.    If you have any questions or concerns, please don't hesitate to call.         Sincerely,        Gladys Blandon, APRN-CNP    CC: No Recipients

## 2025-06-04 NOTE — ED PROVIDER NOTES
History of Present Illness     History provided by: Patient  Limitations to History: None  External Records Reviewed with Brief Summary: None    HPI:  Nikole Almanza is a 20 y.o. female with no pertinent past medical history, G1,  who presents with suspect to be 5 weeks pregnant with lower abdominal pain.  She states that the abdominal pain has been waxing waning is a cramping sensation when its worst 10 out of 10.  Has not had any workup for her pregnancy and her OB/GYN appointment is on Friday.  She does have an increased whitish chunky discharge which she states she has occasionally had it is not malodorous.  She denies any vaginal bleeding, dysuria or hematuria.  Denies any flank pain.  Denies any concern for STDs, chest pain or shortness of breath.  Currently does not have any abdominal or pelvic pain.    Physical Exam   Triage vitals:  T 36.9 °C (98.5 °F)  HR 85  /74  RR 18  O2 100 % None (Room air)    General: Awake, alert, in no acute distress  Eyes: Gaze conjugate.  No scleral icterus or injection  HENT: Normo-cephalic, atraumatic. No stridor  CV: Regular rate, regular rhythm. Radial pulses 2+ bilaterally  Resp: Breathing non-labored, speaking in full sentences.  Clear to auscultation bilaterally  GI: Soft, non-distended, non-tender. No rebound or guarding.  : declined by patient  MSK/Extremities: No gross bony deformities. Moving all extremities  Skin: Warm. Appropriate color  Neuro: Alert. Oriented. Face symmetric. Speech is fluent.     Psych: Appropriate mood and affect    Medical Decision Making & ED Course   ED Course:  ED Course as of 25   1700 HCG, Beta-Quantitative(!): 25,944 [KK]   1700 US PELVIS OB TRANSABDOMINAL W TRANSVAGINAL UP TO 1ST TRIMESTER  IMPRESSION:  Single live intrauterine gestation with a selected sonographic  gestational age of 6 weeks and 4 days. A 0.2 x 0.6 x 0.7 cm  subchorionic hemorrhage is noted.   [KK]   1723 Patient is a  20-year-old female with no pertinent past medical history, G1,  who presents with suspect to be 5 weeks pregnant with lower abdominal pain.  She states that the abdominal pain has been waxing waning is a cramping sensation when its worst 10 out of 10.  Has not had any workup for her pregnancy and her OB/GYN appointment is on Friday.  She does have an increased whitish chunky discharge which she states she has occasionally had it is not malodorous.  She denies any vaginal bleeding, dysuria or hematuria.  Denies any flank pain.  Denies any concern for STDs, chest pain or shortness of breath.  Currently does not have any abdominal or pelvic pain.    On exam, patient does not have any abdominal tenderness, heart lungs clear to auscultation, no pitting edema to bilateral lower extremities.    Workup completed prior to my evaluation showing a positive pregnancy with a beta-hCG of 25,944.  Urine does show some occasional white blood cells, and leukocyte esterase but no nitrates, no bacteria.  With squamous epithelial cells 10-25.  Remainder of lab work unremarkable.  Pelvic ultrasound showing an IUP with a subchorionic hemorrhage.    In the setting of patient's lack of bleeding and no concern for STDs offered pelvic exam and swabs, patient declined at this time.    Differential includes pregnancy, subchorionic hemorrhage, low suspicion for threatened  at this time with no vaginal bleeding and just cramping pain.  No evidence of ectopic.  Recommending she follow-up with her OB/GYN on Friday, but otherwise encouraged use Tylenol for pain. [KK]      ED Course User Index  [KK] Ruy Funes DO         Diagnoses as of 25 8690   Pelvic pain   Abdominal cramping   6 weeks gestation of pregnancy ([facility])       Medical Decision Makin y.o. female presented with chief concern of lower abdominal pain.  Workup evaluation showing a live IUP with a subchorionic hemorrhage.  Otherwise remainder of workup was  unremarkable.  Patient did decline STD testing in favor of her OB/GYN appointment on Friday, which I feel like is appropriate at this time.  Did discuss the importance of follow-up as well as OB/GYN appointment for her subchorionic hemorrhage and restratification.  ----  Discussed signs and symptoms to return the emergency department, all questions were answered, patient agreeable with disposition and discharged in stable condition.    Differential diagnoses considered include but are not limited to: see ED Course    Independent Result Review and Interpretation: see ED course     Chronic conditions affecting the patient's care: as documented in ED course/MDM    The patient was discussed with the following consultants/services: see ED course    Care Considerations: as documented in ED course/MDM      Disposition   As a result of the work-up, the patient was discharged home.  she was informed of her diagnosis and instructed to come back with any concerns or worsening of condition.  she and was agreeable to the plan as discussed above.  she was given the opportunity to ask questions.  All of the patient's questions were answered.    Procedures   Procedures       Ruy Funes,   06/04/25 2894

## 2025-06-04 NOTE — ED TRIAGE NOTES
Presents to the ED with lower ab. Pain, states approx 2 months pregnant, LMP 25. Denies vaginal bleeding, states some yellow tinged discharge at this time. Also states vomiting and nausea. .

## 2025-06-04 NOTE — ED TRIAGE NOTES
" TRIAGE NOTE   I saw the patient as the Clinician in Triage and performed a brief history and physical exam, established acuity, and ordered appropriate tests to develop basic plan of care. Patient will be seen by an FRANKIE, resident and/or physician who will independently evaluate the patient. Please see subsequent provider notes for further details and disposition.     Brief HPI: In brief, Nikole Almanza \"Lizzie" is a 20 y.o. female that presents for intractable nausea or vomiting 3 times a day that lasts about 10 minutes.  She smokes marijuana but has not had any marijuana since Monday.  She buys it from approved dispensary's.  She is approximately 6 weeks pregnant and she is experiencing cramping bilateral pelvic which is rated 5 out of 10.  She denies fever or chills.  She endorses diarrhea since Monday.  Her vital signs were stable in triage.  She denies vaginal bleeding but endorses a yellow discharge.  She has had 2 partners in the last 12 months.  She has good support from her boyfriend and from parents..     Focused Physical exam:   Clear bilateral lung sounds.  Normal S1-S2 and rate.  Skin appears to be normal in temperature and color.  Pelvic exam will be completed in the back when she is in her room.  Plan/MDM:   Ordered STD exam, ultrasound to rule out ectopic pregnancy, urinalysis urine pregnancy hCG beta quant CBC CMP and type and screen.  I ordered 4 mg Zofran 1 L normal saline for the intractable nausea and vomiting.  Please see subsequent provider note for further details and disposition   "

## 2025-06-04 NOTE — PROGRESS NOTES
"Subjective   Patient ID: Nikole Almanza \"Lizzie" is a 20 y.o. female. They present today with a chief complaint of Nausea (Pt c/o nausea with vomiting, epigastric burning, and intermittent intense midline lower abdominal cramping x3 days, pt had +home pregnancy test 06/02/2025 with LMP 04/21/2025, pt denies vaginal bleeding).      Past Medical History  Allergies as of 06/04/2025    (No Known Allergies)       Prescriptions Prior to Admission[1]     Medical History[2]    Surgical History[3]     reports that she has been smoking cigarettes. She has never used smokeless tobacco. She reports current alcohol use of about 1.0 standard drink of alcohol per week. She reports current drug use. Drug: Marijuana.    Review of Systems  Review of Systems   Constitutional: Negative.    Respiratory: Negative.     Cardiovascular: Negative.    Gastrointestinal:  Positive for abdominal pain, nausea and vomiting.   Genitourinary:  Negative for dysuria, hematuria, vaginal bleeding, vaginal discharge and vaginal pain.   Neurological: Negative.    Psychiatric/Behavioral: Negative.                                    Objective    Vitals:    06/04/25 1406   BP: 130/80   Pulse: 82   Resp: 16   SpO2: 98%     Patient's last menstrual period was 04/21/2025 (exact date).    Physical Exam  Constitutional:       Appearance: She is obese.   Cardiovascular:      Rate and Rhythm: Normal rate and regular rhythm.      Pulses: Normal pulses.      Heart sounds: Normal heart sounds.   Pulmonary:      Effort: Pulmonary effort is normal.      Breath sounds: Normal breath sounds.   Abdominal:      General: Bowel sounds are normal.      Palpations: Abdomen is soft.      Tenderness: There is abdominal tenderness in the right upper quadrant, right lower quadrant, epigastric area, periumbilical area and suprapubic area. There is no right CVA tenderness, left CVA tenderness, guarding or rebound.   Musculoskeletal:         General: Normal range of motion. "   Skin:     General: Skin is warm and dry.      Capillary Refill: Capillary refill takes less than 2 seconds.   Neurological:      General: No focal deficit present.      Mental Status: She is alert.   Psychiatric:         Mood and Affect: Mood normal.         Behavior: Behavior normal.         Thought Content: Thought content normal.         Judgment: Judgment normal.         Procedures    Point of Care Test & Imaging Results from this visit  Results for orders placed or performed in visit on 06/04/25   POCT UA Automated manually resulted   Result Value Ref Range    POC Color, Urine Yellow Straw, Yellow, Light-Yellow    POC Appearance, Urine Cloudy (A) Clear    POC Glucose, Urine NEGATIVE NEGATIVE mg/dl    POC Bilirubin, Urine NEGATIVE NEGATIVE    POC Ketones, Urine NEGATIVE NEGATIVE mg/dl    POC Specific Gravity, Urine 1.020 1.005 - 1.035    POC Blood, Urine NEGATIVE NEGATIVE    POC PH, Urine 6.5 No Reference Range Established PH    POC Protein, Urine NEGATIVE NEGATIVE mg/dl    POC Urobilinogen, Urine 0.2 0.2, 1.0 EU/DL    Poc Nitrite, Urine NEGATIVE NEGATIVE    POC Leukocytes, Urine NEGATIVE NEGATIVE   POCT pregnancy, urine manually resulted   Result Value Ref Range    Preg Test, Ur Positive (A) Negative      Imaging  No results found.    Cardiology, Vascular, and Other Imaging  No other imaging results found for the past 2 days      Diagnostic study results (if any) were reviewed by LUIZ Fournier.    Assessment/Plan   Allergies, medications, history, and pertinent labs/EKGs/Imaging reviewed by LUIZ Fournier.     Medical Decision Making  Pregnant female presents with abdominal pain that cannot be properly assessed in office. Patient referred to ED for further evaluation and testing r/o ectopic pregnancy-other pathology.  Dr Siddiqui notified per [facility]    Orders and Diagnoses  Diagnoses and all orders for this visit:  Abdominal pain in early pregnancy  -     POCT UA Automated  manually resulted  -     POCT pregnancy, urine manually resulted      Medical Admin Record      Patient disposition: ED    Electronically signed by ANTWAN Fournier-ADRIANNE  2:44 PM           [1] (Not in a hospital admission)  [2]   Past Medical History:  Diagnosis Date    Anxiety     Bipolar 1 disorder (Multi)     Depressed     GERD (gastroesophageal reflux disease)     IBS (irritable bowel syndrome)     OCD (obsessive compulsive disorder)    [3]   Past Surgical History:  Procedure Laterality Date    OTHER SURGICAL HISTORY  2007    polp removed    TONSILLECTOMY  06/2019    WISDOM TOOTH EXTRACTION  10/2020

## 2025-06-05 LAB
HCV AB SER QL: NONREACTIVE
HIV 1+2 AB+HIV1 P24 AG SERPL QL IA: NONREACTIVE
HOLD SPECIMEN: NORMAL
TREPONEMA PALLIDUM IGG+IGM AB [PRESENCE] IN SERUM OR PLASMA BY IMMUNOASSAY: NONREACTIVE

## 2025-06-06 ENCOUNTER — HOSPITAL ENCOUNTER (EMERGENCY)
Facility: HOSPITAL | Age: 20
Discharge: OTHER INSTITUTION | End: 2025-06-06
Attending: EMERGENCY MEDICINE
Payer: COMMERCIAL

## 2025-06-06 VITALS
BODY MASS INDEX: 45 KG/M2 | DIASTOLIC BLOOD PRESSURE: 89 MMHG | RESPIRATION RATE: 19 BRPM | HEART RATE: 80 BPM | SYSTOLIC BLOOD PRESSURE: 134 MMHG | WEIGHT: 280 LBS | OXYGEN SATURATION: 99 % | TEMPERATURE: 97.2 F | HEIGHT: 66 IN

## 2025-06-06 DIAGNOSIS — O21.0 HYPEREMESIS GRAVIDARUM (HHS-HCC): Primary | ICD-10-CM

## 2025-06-06 LAB
ALBUMIN SERPL BCP-MCNC: 4.4 G/DL (ref 3.4–5)
ALP SERPL-CCNC: 56 U/L (ref 33–110)
ALT SERPL W P-5'-P-CCNC: 9 U/L (ref 7–45)
ANION GAP SERPL CALC-SCNC: 13 MMOL/L (ref 10–20)
APPEARANCE UR: CLEAR
AST SERPL W P-5'-P-CCNC: 13 U/L (ref 9–39)
B-HCG SERPL-ACNC: ABNORMAL MIU/ML
BACTERIA UR CULT: NORMAL
BASOPHILS # BLD AUTO: 0.02 X10*3/UL (ref 0–0.1)
BASOPHILS NFR BLD AUTO: 0.2 %
BILIRUB DIRECT SERPL-MCNC: 0.1 MG/DL (ref 0–0.3)
BILIRUB SERPL-MCNC: 0.6 MG/DL (ref 0–1.2)
BILIRUB UR STRIP.AUTO-MCNC: NEGATIVE MG/DL
BUN SERPL-MCNC: 7 MG/DL (ref 6–23)
CALCIUM SERPL-MCNC: 9.2 MG/DL (ref 8.6–10.3)
CHLORIDE SERPL-SCNC: 106 MMOL/L (ref 98–107)
CO2 SERPL-SCNC: 21 MMOL/L (ref 21–32)
COLOR UR: YELLOW
CREAT SERPL-MCNC: 0.61 MG/DL (ref 0.5–1.05)
EGFRCR SERPLBLD CKD-EPI 2021: >90 ML/MIN/1.73M*2
EOSINOPHIL # BLD AUTO: 0.03 X10*3/UL (ref 0–0.7)
EOSINOPHIL NFR BLD AUTO: 0.3 %
ERYTHROCYTE [DISTWIDTH] IN BLOOD BY AUTOMATED COUNT: 12.7 % (ref 11.5–14.5)
GLUCOSE SERPL-MCNC: 100 MG/DL (ref 74–99)
GLUCOSE UR STRIP.AUTO-MCNC: NORMAL MG/DL
HCT VFR BLD AUTO: 43 % (ref 36–46)
HGB BLD-MCNC: 14.5 G/DL (ref 12–16)
IMM GRANULOCYTES # BLD AUTO: 0.05 X10*3/UL (ref 0–0.7)
IMM GRANULOCYTES NFR BLD AUTO: 0.4 % (ref 0–0.9)
KETONES UR STRIP.AUTO-MCNC: ABNORMAL MG/DL
LEUKOCYTE ESTERASE UR QL STRIP.AUTO: NEGATIVE
LYMPHOCYTES # BLD AUTO: 1.77 X10*3/UL (ref 1.2–4.8)
LYMPHOCYTES NFR BLD AUTO: 15 %
MCH RBC QN AUTO: 28.9 PG (ref 26–34)
MCHC RBC AUTO-ENTMCNC: 33.7 G/DL (ref 32–36)
MCV RBC AUTO: 86 FL (ref 80–100)
MONOCYTES # BLD AUTO: 0.5 X10*3/UL (ref 0.1–1)
MONOCYTES NFR BLD AUTO: 4.2 %
NEUTROPHILS # BLD AUTO: 9.41 X10*3/UL (ref 1.2–7.7)
NEUTROPHILS NFR BLD AUTO: 79.9 %
NITRITE UR QL STRIP.AUTO: NEGATIVE
NRBC BLD-RTO: 0 /100 WBCS (ref 0–0)
PH UR STRIP.AUTO: 6.5 [PH]
PLATELET # BLD AUTO: 257 X10*3/UL (ref 150–450)
POTASSIUM SERPL-SCNC: 3.7 MMOL/L (ref 3.5–5.3)
PROT SERPL-MCNC: 7 G/DL (ref 6.4–8.2)
PROT UR STRIP.AUTO-MCNC: NEGATIVE MG/DL
RBC # BLD AUTO: 5.02 X10*6/UL (ref 4–5.2)
RBC # UR STRIP.AUTO: NEGATIVE MG/DL
SODIUM SERPL-SCNC: 136 MMOL/L (ref 136–145)
SP GR UR STRIP.AUTO: 1.02
UROBILINOGEN UR STRIP.AUTO-MCNC: NORMAL MG/DL
WBC # BLD AUTO: 11.8 X10*3/UL (ref 4.4–11.3)

## 2025-06-06 PROCEDURE — 96374 THER/PROPH/DIAG INJ IV PUSH: CPT

## 2025-06-06 PROCEDURE — 84075 ASSAY ALKALINE PHOSPHATASE: CPT | Performed by: EMERGENCY MEDICINE

## 2025-06-06 PROCEDURE — 99284 EMERGENCY DEPT VISIT MOD MDM: CPT | Performed by: EMERGENCY MEDICINE

## 2025-06-06 PROCEDURE — 36415 COLL VENOUS BLD VENIPUNCTURE: CPT | Performed by: EMERGENCY MEDICINE

## 2025-06-06 PROCEDURE — 2500000004 HC RX 250 GENERAL PHARMACY W/ HCPCS (ALT 636 FOR OP/ED): Performed by: EMERGENCY MEDICINE

## 2025-06-06 PROCEDURE — 82248 BILIRUBIN DIRECT: CPT | Performed by: EMERGENCY MEDICINE

## 2025-06-06 PROCEDURE — 85025 COMPLETE CBC W/AUTO DIFF WBC: CPT | Performed by: EMERGENCY MEDICINE

## 2025-06-06 PROCEDURE — 96361 HYDRATE IV INFUSION ADD-ON: CPT

## 2025-06-06 PROCEDURE — 81003 URINALYSIS AUTO W/O SCOPE: CPT | Performed by: EMERGENCY MEDICINE

## 2025-06-06 PROCEDURE — 82374 ASSAY BLOOD CARBON DIOXIDE: CPT | Performed by: EMERGENCY MEDICINE

## 2025-06-06 PROCEDURE — 84702 CHORIONIC GONADOTROPIN TEST: CPT | Performed by: EMERGENCY MEDICINE

## 2025-06-06 RX ORDER — PROCHLORPERAZINE EDISYLATE 5 MG/ML
10 INJECTION INTRAMUSCULAR; INTRAVENOUS ONCE
Status: COMPLETED | OUTPATIENT
Start: 2025-06-06 | End: 2025-06-06

## 2025-06-06 RX ADMIN — PROCHLORPERAZINE EDISYLATE 10 MG: 5 INJECTION INTRAMUSCULAR; INTRAVENOUS at 12:09

## 2025-06-06 RX ADMIN — SODIUM CHLORIDE 1000 ML: 0.9 INJECTION, SOLUTION INTRAVENOUS at 12:10

## 2025-06-06 ASSESSMENT — COLUMBIA-SUICIDE SEVERITY RATING SCALE - C-SSRS
6. HAVE YOU EVER DONE ANYTHING, STARTED TO DO ANYTHING, OR PREPARED TO DO ANYTHING TO END YOUR LIFE?: NO
1. IN THE PAST MONTH, HAVE YOU WISHED YOU WERE DEAD OR WISHED YOU COULD GO TO SLEEP AND NOT WAKE UP?: NO
2. HAVE YOU ACTUALLY HAD ANY THOUGHTS OF KILLING YOURSELF?: NO

## 2025-06-06 ASSESSMENT — PAIN SCALES - GENERAL: PAINLEVEL_OUTOF10: 5 - MODERATE PAIN

## 2025-06-06 ASSESSMENT — PAIN DESCRIPTION - LOCATION: LOCATION: ABDOMEN

## 2025-06-06 ASSESSMENT — PAIN - FUNCTIONAL ASSESSMENT: PAIN_FUNCTIONAL_ASSESSMENT: 0-10

## 2025-06-06 NOTE — ED PROVIDER NOTES
Emergency Department Provider Note       HPI: []  20-year-old white female history of anxiety depression in the past about 6 weeks pregnant was seen at outside facility 2 days ago ultrasound confirmed live IUP comes in with only abdominal pain and vomiting.  No spotting or bleeding.  No flank pain.  No urinary frequency urgency materia no chest pain pressure no fever chills cough congestion incontinences years did not take any treatment before coming Emergency Department no prenatal care so far.    Past history: Anxiety, depression, high BMI  Social: Patient denies a current tobacco alcohol drug use.  REVIEW OF SYSTEMS:    GENERAL.: No weight loss, fatigue, anorexia, insomnia, fever.    EYES: No vision loss, double vision, drainage, eye pain.    ENT: No pharyngitis, dry mouth.    CARDIOPULMONARY: No chest pain, palpitations, syncope, near syncope. No shortness of breath, cough, hemoptysis.    GI: Positive for abdominal pain, change in bowel habits, melena, hematemesis, hematochezia, nausea, positive for vomiting, diarrhea.    : No discharge, dysuria, frequency, urgency, hematuria.    MS: No limb pain, joint pain, joint swelling.    SKIN: No rashes.    PSYCH: No depression, anxiety, suicidality, homicidality.    Review of systems is otherwise negative unless stated above or in history of present illness.  Social history, family history, allergies reviewed.  PHYSICAL EXAM:    GENERAL: Vitals noted, no distress. Alert and oriented  x 3. Non-toxic.  High BMI    EENT: TMs clear. Posterior oropharynx unremarkable. No meningismus. No LAD.     NECK: Supple. Nontender. No midline tenderness.     CARDIAC: Regular, rate, rhythm. No murmurs rubs or gallops. No JVD    PULMONARY: Lungs clear bilaterally with good aeration. No wheezes rales or rhonchi. No respiratory distress.     ABDOMEN: Soft, nonsurgical. Nontender. No peritoneal signs. Normoactive bowel sounds. No pulsatile masses.  Negative Torrez sign and negative  McBurney point tenderness no CVA tenderness negative inguinal hernias benign nonsurgical abdomen    EXTREMITIES: No peripheral edema. Negative Homans bilaterally, no cords.  2+ bounding pulses well-perfused.    SKIN: No rash. Intact.     NEURO: No focal neurologic deficits, NIH score of 0. Cranial nerves normal as tested from II through XII.     MEDICAL DECISION MAKING:        Ultrasound confirmed by myself independently 2 days ago confirmed a single IUP 6 weeks 4 days with a subchorionic hemorrhage    CBC showed leukocytosis 11.8, stable hemoglobin, chemistries LFTs unremarkable serum hCG is rising appropriately 32,000, urinalysis showed 2+ ketones.    Treatment in the ED: IV established given IV fluids and IV Compazine    ED course: On repeat assessment unfortunately patient left without treatment complete.    Impression: #1 hyperemesis gravidarum, #2..  First trimester pregnancy    Plan/MDM: 20-year-old female primigravida comes in with ongoing abdominal discomfort and vomiting serum hCG rising appropriately she has no spotting or bleeding very benign examination my suspicion for miscarriage or ectopic pregnancy is low.  Suspicion for appendicitis is low.  Low suspicion of cholecystitis or biliary colic unfortunately left the ED without treatment.                                            El Andrade MD  06/06/25 [id number]       El Andrade MD  06/06/25 7058